# Patient Record
Sex: FEMALE
[De-identification: names, ages, dates, MRNs, and addresses within clinical notes are randomized per-mention and may not be internally consistent; named-entity substitution may affect disease eponyms.]

---

## 2017-01-01 PROBLEM — Z86.19 HX OF HEPATITIS C: Status: ACTIVE | Noted: 2017-01-01

## 2017-01-01 PROBLEM — Z91.89 AT RISK FOR THROMBOSIS: Status: ACTIVE | Noted: 2017-01-01

## 2017-01-01 PROBLEM — E83.111 IRON OVERLOAD DUE TO REPEATED RED BLOOD CELL TRANSFUSIONS: Status: ACTIVE | Noted: 2017-01-01

## 2017-01-01 PROBLEM — Z86.711 HX PULMONARY EMBOLISM: Status: ACTIVE | Noted: 2017-01-01

## 2017-02-05 ENCOUNTER — HOSPITAL (OUTPATIENT)
Dept: OTHER | Age: 27
End: 2017-02-05
Attending: EMERGENCY MEDICINE

## 2017-02-05 LAB
ALBUMIN SERPL-MCNC: 3.5 GM/DL (ref 3.6–5.1)
ALBUMIN/GLOB SERPL: 0.8 {RATIO} (ref 1–2.4)
ALP SERPL-CCNC: 211 UNIT/L (ref 45–117)
ALT SERPL-CCNC: 120 UNIT/L
AMORPH SED URNS QL MICRO: ABNORMAL
ANALYZER ANC (IANC): ABNORMAL
ANION GAP SERPL CALC-SCNC: 14 MMOL/L (ref 10–20)
APPEARANCE UR: CLEAR
AST SERPL-CCNC: 143 UNIT/L
BACTERIA #/AREA URNS HPF: ABNORMAL /HPF
BASOPHILS # BLD: 0 THOUSAND/MCL (ref 0–0.3)
BASOPHILS NFR BLD: 0 %
BILIRUB SERPL-MCNC: 4.4 MG/DL (ref 0.2–1)
BILIRUB UR QL: NEGATIVE
BUN SERPL-MCNC: 5 MG/DL (ref 10–20)
BUN/CREAT SERPL: 7 (ref 7–25)
CALCIUM SERPL-MCNC: 8.2 MG/DL (ref 8.4–10.2)
CAOX CRY URNS QL MICRO: ABNORMAL
CHLORIDE: 106 MMOL/L (ref 98–107)
CO2 SERPL-SCNC: 25 MMOL/L (ref 21–32)
COLOR UR: ABNORMAL
CREAT SERPL-MCNC: 0.69 MG/DL (ref 0.51–0.95)
DIFFERENTIAL METHOD BLD: ABNORMAL
EOSINOPHIL # BLD: 0 THOUSAND/MCL (ref 0.1–0.5)
EOSINOPHIL NFR BLD: 0 %
EPITH CASTS #/AREA URNS LPF: ABNORMAL /[LPF]
ERYTHROCYTE [DISTWIDTH] IN BLOOD: 23.4 % (ref 11–15)
FATTY CASTS #/AREA URNS LPF: ABNORMAL /[LPF]
GLOBULIN SER-MCNC: 4.2 GM/DL (ref 2–4)
GLUCOSE SERPL-MCNC: 89 MG/DL (ref 65–99)
GLUCOSE UR-MCNC: NEGATIVE MG/DL
GRAN CASTS #/AREA URNS LPF: ABNORMAL /[LPF]
HCG POINT OF CARE (5HGRST): NEGATIVE
HEMATOCRIT: 22.5 % (ref 36–46.5)
HG POINT OF CARE QC: NORMAL
HGB BLD-MCNC: 7.8 GM/DL (ref 12–15.5)
HGB UR QL: ABNORMAL
HYALINE CASTS #/AREA URNS LPF: ABNORMAL /LPF (ref 0–5)
HYPOCHROMIA (HYPOC): ABNORMAL
KETONES UR-MCNC: NEGATIVE MG/DL
LACTATE BLDV-SCNC: 1.3 MMOL/L (ref 0–2)
LARGE PLATELETS (PLTL): PRESENT
LEUKOCYTE ESTERASE UR QL STRIP: NEGATIVE
LYMPHOCYTES # BLD: 2.8 THOUSAND/MCL (ref 1–4.8)
LYMPHOCYTES NFR BLD: 19 %
MACROCYTOSIS (MACRO): ABNORMAL
MCH RBC QN AUTO: 33.2 PG (ref 26–34)
MCHC RBC AUTO-ENTMCNC: 34.7 GM/DL (ref 32–36.5)
MCV RBC AUTO: 95.7 FL (ref 78–100)
MICROCYTOSIS (MICY): ABNORMAL
MIXED CELL CASTS #/AREA URNS LPF: ABNORMAL /[LPF]
MONOCYTES # BLD: 3.3 THOUSAND/MCL (ref 0.3–0.9)
MONOCYTES NFR BLD: 23 %
MUCOUS THREADS URNS QL MICRO: PRESENT
NEUTROPHILS # BLD: 8.4 THOUSAND/MCL (ref 1.8–7.7)
NEUTROPHILS NFR BLD: 58 %
NEUTS SEG NFR BLD: ABNORMAL %
NITRITE UR QL: NEGATIVE
PERCENT NRBC: 5
PH UR: 7 UNIT (ref 5–7)
PLATELET # BLD: 273 THOUSAND/MCL (ref 140–450)
POLYCHROMASIA (POLY): ABNORMAL
POTASSIUM SERPL-SCNC: 4.1 MMOL/L (ref 3.4–5.1)
PROT SERPL-MCNC: 7.7 GM/DL (ref 6.4–8.2)
PROT UR QL: NEGATIVE MG/DL
RBC # BLD: 2.35 MILLION/MCL (ref 4–5.2)
RBC #/AREA URNS HPF: ABNORMAL /HPF (ref 0–3)
RBC CASTS #/AREA URNS LPF: ABNORMAL /[LPF]
RENAL EPI CELLS #/AREA URNS HPF: ABNORMAL /[HPF]
SICKLE CELLS (SICL): ABNORMAL
SODIUM SERPL-SCNC: 141 MMOL/L (ref 135–145)
SP GR UR: 1.01 (ref 1–1.03)
SPECIMEN SOURCE: ABNORMAL
SPERM URNS QL MICRO: ABNORMAL
SQUAMOUS #/AREA URNS HPF: ABNORMAL /HPF (ref 0–5)
T VAGINALIS URNS QL MICRO: ABNORMAL
TARGET CELLS (TARGET): ABNORMAL
TOXIC GRANULATION (TOXIC): PRESENT
TRI-PHOS CRY URNS QL MICRO: ABNORMAL
URATE CRY URNS QL MICRO: ABNORMAL
URINE REFLEX: ABNORMAL
URNS CMNT MICRO: ABNORMAL
UROBILINOGEN UR QL: 4 MG/DL (ref 0–1)
WAXY CASTS #/AREA URNS LPF: ABNORMAL /[LPF]
WBC # BLD: 14.5 THOUSAND/MCL (ref 4.2–11)
WBC #/AREA URNS HPF: ABNORMAL /HPF (ref 0–5)
WBC CASTS #/AREA URNS LPF: ABNORMAL /[LPF]
YEAST HYPHAE URNS QL MICRO: ABNORMAL
YEAST URNS QL MICRO: ABNORMAL

## 2017-02-06 ENCOUNTER — HOSPITAL (OUTPATIENT)
Dept: OTHER | Age: 27
End: 2017-02-06
Attending: INTERNAL MEDICINE

## 2017-02-06 LAB
ALBUMIN SERPL-MCNC: 3.4 GM/DL (ref 3.6–5.1)
ALBUMIN/GLOB SERPL: 0.8 {RATIO} (ref 1–2.4)
ALP SERPL-CCNC: 200 UNIT/L (ref 45–117)
ALT SERPL-CCNC: 123 UNIT/L
ANALYZER ANC (IANC): ABNORMAL
ANION GAP SERPL CALC-SCNC: 16 MMOL/L (ref 10–20)
AST SERPL-CCNC: 184 UNIT/L
BASOPHILS # BLD: 0.1 THOUSAND/MCL (ref 0–0.3)
BASOPHILS NFR BLD: 0 %
BILIRUB SERPL-MCNC: 5 MG/DL (ref 0.2–1)
BUN SERPL-MCNC: 8 MG/DL (ref 10–20)
BUN/CREAT SERPL: 12 (ref 7–25)
CALCIUM SERPL-MCNC: 8.2 MG/DL (ref 8.4–10.2)
CHLORIDE: 108 MMOL/L (ref 98–107)
CO2 SERPL-SCNC: 22 MMOL/L (ref 21–32)
CREAT SERPL-MCNC: 0.67 MG/DL (ref 0.51–0.95)
DIFFERENTIAL METHOD BLD: ABNORMAL
EOSINOPHIL # BLD: 0 THOUSAND/MCL (ref 0.1–0.5)
EOSINOPHIL NFR BLD: 0 %
ERYTHROCYTE [DISTWIDTH] IN BLOOD: 23.1 % (ref 11–15)
GLOBULIN SER-MCNC: 4.2 GM/DL (ref 2–4)
GLUCOSE SERPL-MCNC: 90 MG/DL (ref 65–99)
HEMATOCRIT: 20.4 % (ref 36–46.5)
HGB BLD-MCNC: 7.4 GM/DL (ref 12–15.5)
HYPOCHROMIA (HYPOC): ABNORMAL
LARGE PLATELETS (PLTL): PRESENT
LYMPHOCYTES # BLD: 6.8 THOUSAND/MCL (ref 1–4.8)
LYMPHOCYTES NFR BLD: 42 %
MACROCYTOSIS (MACRO): ABNORMAL
MCH RBC QN AUTO: 32.9 PG (ref 26–34)
MCHC RBC AUTO-ENTMCNC: 36.3 GM/DL (ref 32–36.5)
MCV RBC AUTO: 90.7 FL (ref 78–100)
MICROCYTOSIS (MICY): ABNORMAL
MONOCYTES # BLD: 2.4 THOUSAND/MCL (ref 0.3–0.9)
MONOCYTES NFR BLD: 15 %
NEUTROPHILS # BLD: 6.9 THOUSAND/MCL (ref 1.8–7.7)
NEUTROPHILS NFR BLD: 43 %
NEUTS SEG NFR BLD: ABNORMAL %
OVALOCYTES (OVALO): ABNORMAL
PERCENT NRBC: 9
PLATELET # BLD: 233 THOUSAND/MCL (ref 140–450)
POLYCHROMASIA (POLY): ABNORMAL
POTASSIUM SERPL-SCNC: 4 MMOL/L (ref 3.4–5.1)
PROT SERPL-MCNC: 7.6 GM/DL (ref 6.4–8.2)
RBC # BLD: 2.25 MILLION/MCL (ref 4–5.2)
SICKLE CELLS (SICL): ABNORMAL
SODIUM SERPL-SCNC: 142 MMOL/L (ref 135–145)
TARGET CELLS (TARGET): ABNORMAL
TOXIC GRANULATION (TOXIC): PRESENT
TOXIC VACUOLATION (TOXV): PRESENT
WBC # BLD: 16.2 THOUSAND/MCL (ref 4.2–11)

## 2017-02-07 LAB
ALBUMIN SERPL-MCNC: 2.8 GM/DL (ref 3.6–5.1)
ALBUMIN/GLOB SERPL: 0.8 {RATIO} (ref 1–2.4)
ALP SERPL-CCNC: 161 UNIT/L (ref 45–117)
ALT SERPL-CCNC: 99 UNIT/L
ANALYZER ANC (IANC): ABNORMAL
ANION GAP SERPL CALC-SCNC: 11 MMOL/L (ref 10–20)
AST SERPL-CCNC: 141 UNIT/L
BASOPHILS # BLD: 0 THOUSAND/MCL (ref 0–0.3)
BASOPHILS NFR BLD: 0 %
BILIRUB SERPL-MCNC: 3.4 MG/DL (ref 0.2–1)
BUN SERPL-MCNC: 10 MG/DL (ref 10–20)
BUN/CREAT SERPL: 18 (ref 7–25)
CALCIUM SERPL-MCNC: 7.6 MG/DL (ref 8.4–10.2)
CHLORIDE: 112 MMOL/L (ref 98–107)
CO2 SERPL-SCNC: 24 MMOL/L (ref 21–32)
CREAT SERPL-MCNC: 0.56 MG/DL (ref 0.51–0.95)
DIFFERENTIAL METHOD BLD: ABNORMAL
EOSINOPHIL # BLD: 0.1 THOUSAND/MCL (ref 0.1–0.5)
EOSINOPHIL NFR BLD: 1 %
ERYTHROCYTE [DISTWIDTH] IN BLOOD: 21 % (ref 11–15)
GLOBULIN SER-MCNC: 3.7 GM/DL (ref 2–4)
GLUCOSE SERPL-MCNC: 89 MG/DL (ref 65–99)
HEMATOCRIT: 21.8 % (ref 36–46.5)
HGB BLD-MCNC: 7.8 GM/DL (ref 12–15.5)
HYPOCHROMIA (HYPOC): ABNORMAL
LARGE PLATELETS (PLTL): PRESENT
LYMPHOCYTES # BLD: 8.6 THOUSAND/MCL (ref 1–4.8)
LYMPHOCYTES NFR BLD: 61 %
MACROCYTOSIS (MACRO): ABNORMAL
MCH RBC QN AUTO: 32 PG (ref 26–34)
MCHC RBC AUTO-ENTMCNC: 35.8 GM/DL (ref 32–36.5)
MCV RBC AUTO: 89.3 FL (ref 78–100)
MICROCYTOSIS (MICY): ABNORMAL
MONOCYTES # BLD: 0.8 THOUSAND/MCL (ref 0.3–0.9)
MONOCYTES NFR BLD: 6 %
NEUTROPHILS # BLD: 4.5 THOUSAND/MCL (ref 1.8–7.7)
NEUTS SEG NFR BLD: 32 %
NRBC BLD MANUAL-RTO: 20 /100 WBC
PATH REV BLD -IMP: ABNORMAL
PLATELET # BLD: 199 THOUSAND/MCL (ref 140–450)
POLYCHROMASIA (POLY): ABNORMAL
POTASSIUM SERPL-SCNC: 4.1 MMOL/L (ref 3.4–5.1)
PROT SERPL-MCNC: 6.5 GM/DL (ref 6.4–8.2)
RBC # BLD: 2.44 MILLION/MCL (ref 4–5.2)
SICKLE CELLS (SICL): ABNORMAL
SODIUM SERPL-SCNC: 143 MMOL/L (ref 135–145)
TARGET CELLS (TARGET): ABNORMAL
TOXIC GRANULATION (TOXIC): PRESENT
WBC # BLD: 14.1 THOUSAND/MCL (ref 4.2–11)

## 2017-02-08 LAB
ALBUMIN SERPL-MCNC: 3 GM/DL (ref 3.6–5.1)
ALBUMIN/GLOB SERPL: 0.8 {RATIO} (ref 1–2.4)
ALP SERPL-CCNC: 168 UNIT/L (ref 45–117)
ALT SERPL-CCNC: 108 UNIT/L
ANALYZER ANC (IANC): ABNORMAL
ANION GAP SERPL CALC-SCNC: 11 MMOL/L (ref 10–20)
AST SERPL-CCNC: 149 UNIT/L
BASOPHILS # BLD: 0 THOUSAND/MCL (ref 0–0.3)
BASOPHILS NFR BLD: 0 %
BILIRUB SERPL-MCNC: 3.4 MG/DL (ref 0.2–1)
BUN SERPL-MCNC: 7 MG/DL (ref 10–20)
BUN/CREAT SERPL: 11 (ref 7–25)
CALCIUM SERPL-MCNC: 7.7 MG/DL (ref 8.4–10.2)
CHLORIDE: 109 MMOL/L (ref 98–107)
CO2 SERPL-SCNC: 25 MMOL/L (ref 21–32)
CREAT SERPL-MCNC: 0.65 MG/DL (ref 0.51–0.95)
DIFFERENTIAL METHOD BLD: ABNORMAL
EOSINOPHIL # BLD: 0 THOUSAND/MCL (ref 0.1–0.5)
EOSINOPHIL NFR BLD: 0 %
ERYTHROCYTE [DISTWIDTH] IN BLOOD: 22.3 % (ref 11–15)
GLOBULIN SER-MCNC: 3.9 GM/DL (ref 2–4)
GLUCOSE SERPL-MCNC: 89 MG/DL (ref 65–99)
HEMATOCRIT: 21.5 % (ref 36–46.5)
HGB BLD-MCNC: 7.4 GM/DL (ref 12–15.5)
HYPOCHROMIA (HYPOC): ABNORMAL
LARGE PLATELETS (PLTL): PRESENT
LYMPHOCYTES # BLD: 7.7 THOUSAND/MCL (ref 1–4.8)
LYMPHOCYTES NFR BLD: 48 %
MACROCYTOSIS (MACRO): ABNORMAL
MCH RBC QN AUTO: 32 PG (ref 26–34)
MCHC RBC AUTO-ENTMCNC: 34.4 GM/DL (ref 32–36.5)
MCV RBC AUTO: 93.1 FL (ref 78–100)
MICROCYTOSIS (MICY): ABNORMAL
MONOCYTES # BLD: 0.6 THOUSAND/MCL (ref 0.3–0.9)
MONOCYTES NFR BLD: 4 %
NEUTROPHILS # BLD: 7.7 THOUSAND/MCL (ref 1.8–7.7)
NEUTS SEG NFR BLD: 48 %
NRBC BLD MANUAL-RTO: 10 /100 WBC
PATH REV BLD -IMP: ABNORMAL
PLATELET # BLD: 214 THOUSAND/MCL (ref 140–450)
POLYCHROMASIA (POLY): ABNORMAL
POTASSIUM SERPL-SCNC: 4.2 MMOL/L (ref 3.4–5.1)
PROT SERPL-MCNC: 6.9 GM/DL (ref 6.4–8.2)
RBC # BLD: 2.31 MILLION/MCL (ref 4–5.2)
SICKLE CELLS (SICL): ABNORMAL
SODIUM SERPL-SCNC: 141 MMOL/L (ref 135–145)
TARGET CELLS (TARGET): ABNORMAL
WBC # BLD: 16.1 THOUSAND/MCL (ref 4.2–11)

## 2017-02-09 LAB
ALBUMIN SERPL-MCNC: 3.1 GM/DL (ref 3.6–5.1)
ALBUMIN/GLOB SERPL: 0.8 {RATIO} (ref 1–2.4)
ALP SERPL-CCNC: 177 UNIT/L (ref 45–117)
ALT SERPL-CCNC: 95 UNIT/L
ANALYZER ANC (IANC): ABNORMAL
ANION GAP SERPL CALC-SCNC: 12 MMOL/L (ref 10–20)
AST SERPL-CCNC: 120 UNIT/L
BASOPHILS # BLD: 0 THOUSAND/MCL (ref 0–0.3)
BASOPHILS NFR BLD: 0 %
BILIRUB SERPL-MCNC: 3.8 MG/DL (ref 0.2–1)
BUN SERPL-MCNC: 9 MG/DL (ref 10–20)
BUN/CREAT SERPL: 13 (ref 7–25)
CALCIUM SERPL-MCNC: 7.9 MG/DL (ref 8.4–10.2)
CHLORIDE: 109 MMOL/L (ref 98–107)
CO2 SERPL-SCNC: 25 MMOL/L (ref 21–32)
CREAT SERPL-MCNC: 0.68 MG/DL (ref 0.51–0.95)
DIFFERENTIAL METHOD BLD: ABNORMAL
EOSINOPHIL # BLD: 0 THOUSAND/MCL (ref 0.1–0.5)
EOSINOPHIL NFR BLD: 0 %
ERYTHROCYTE [DISTWIDTH] IN BLOOD: 22.8 % (ref 11–15)
GLOBULIN SER-MCNC: 4.1 GM/DL (ref 2–4)
GLUCOSE SERPL-MCNC: 92 MG/DL (ref 65–99)
HEMATOCRIT: 21.2 % (ref 36–46.5)
HGB BLD-MCNC: 7.5 GM/DL (ref 12–15.5)
HYPOCHROMIA (HYPOC): ABNORMAL
LYMPHOCYTES # BLD: 8.4 THOUSAND/MCL (ref 1–4.8)
LYMPHOCYTES NFR BLD: 58 %
MACROCYTOSIS (MACRO): ABNORMAL
MCH RBC QN AUTO: 32.6 PG (ref 26–34)
MCHC RBC AUTO-ENTMCNC: 35.4 GM/DL (ref 32–36.5)
MCV RBC AUTO: 92.2 FL (ref 78–100)
MICROCYTOSIS (MICY): ABNORMAL
MONOCYTES # BLD: 1.2 THOUSAND/MCL (ref 0.3–0.9)
MONOCYTES NFR BLD: 8 %
NEUTROPHILS # BLD: 4.9 THOUSAND/MCL (ref 1.8–7.7)
NEUTS SEG NFR BLD: 34 %
NRBC BLD MANUAL-RTO: 12 /100 WBC
PATH REV BLD -IMP: ABNORMAL
PLAT MORPH BLD: NORMAL
PLATELET # BLD: 208 THOUSAND/MCL (ref 140–450)
POLYCHROMASIA (POLY): ABNORMAL
POTASSIUM SERPL-SCNC: 3.9 MMOL/L (ref 3.4–5.1)
PROT SERPL-MCNC: 7.2 GM/DL (ref 6.4–8.2)
RBC # BLD: 2.3 MILLION/MCL (ref 4–5.2)
SICKLE CELLS (SICL): ABNORMAL
SODIUM SERPL-SCNC: 142 MMOL/L (ref 135–145)
TARGET CELLS (TARGET): ABNORMAL
WBC # BLD: 14.5 THOUSAND/MCL (ref 4.2–11)

## 2017-02-10 LAB
ALBUMIN SERPL-MCNC: 3.3 GM/DL (ref 3.6–5.1)
ALBUMIN/GLOB SERPL: 0.8 {RATIO} (ref 1–2.4)
ALP SERPL-CCNC: 188 UNIT/L (ref 45–117)
ALT SERPL-CCNC: 91 UNIT/L
ANALYZER ANC (IANC): ABNORMAL
ANION GAP SERPL CALC-SCNC: 12 MMOL/L (ref 10–20)
AST SERPL-CCNC: 110 UNIT/L
BASOPHILIC STIPLING (BAST): PRESENT
BASOPHILS # BLD: 0 THOUSAND/MCL (ref 0–0.3)
BASOPHILS NFR BLD: 0 %
BILIRUB SERPL-MCNC: 4.1 MG/DL (ref 0.2–1)
BUN SERPL-MCNC: 8 MG/DL (ref 10–20)
BUN/CREAT SERPL: 12 (ref 7–25)
CALCIUM SERPL-MCNC: 8 MG/DL (ref 8.4–10.2)
CHLORIDE: 108 MMOL/L (ref 98–107)
CO2 SERPL-SCNC: 26 MMOL/L (ref 21–32)
CREAT SERPL-MCNC: 0.65 MG/DL (ref 0.51–0.95)
DIFFERENTIAL METHOD BLD: ABNORMAL
EOSINOPHIL # BLD: 0.1 THOUSAND/MCL (ref 0.1–0.5)
EOSINOPHIL NFR BLD: 1 %
ERYTHROCYTE [DISTWIDTH] IN BLOOD: 22.8 % (ref 11–15)
GLOBULIN SER-MCNC: 4 GM/DL (ref 2–4)
GLUCOSE SERPL-MCNC: 104 MG/DL (ref 65–99)
HEMATOCRIT: 19.9 % (ref 36–46.5)
HGB BLD-MCNC: 7 GM/DL (ref 12–15.5)
HOWELL-JOLLY BODIES (HJBO): PRESENT
HYPOCHROMIA (HYPOC): ABNORMAL
LYMPHOCYTES # BLD: 6.1 THOUSAND/MCL (ref 1–4.8)
LYMPHOCYTES NFR BLD: 42 %
MACROCYTOSIS (MACRO): ABNORMAL
MCH RBC QN AUTO: 32.9 PG (ref 26–34)
MCHC RBC AUTO-ENTMCNC: 35.2 GM/DL (ref 32–36.5)
MCV RBC AUTO: 93.4 FL (ref 78–100)
METAMYELOCYTES NFR BLD: 1 % (ref 0–2)
MICROCYTOSIS (MICY): ABNORMAL
MONOCYTES # BLD: 0.8 THOUSAND/MCL (ref 0.3–0.9)
MONOCYTES NFR BLD: 6 %
NEUTROPHILS # BLD: 7.3 THOUSAND/MCL (ref 1.8–7.7)
NEUTS SEG NFR BLD: 50 %
NRBC BLD MANUAL-RTO: 15 /100 WBC
PATH REV BLD -IMP: ABNORMAL
PLATELET # BLD: 212 THOUSAND/MCL (ref 140–450)
POLYCHROMASIA (POLY): ABNORMAL
POTASSIUM SERPL-SCNC: 4.1 MMOL/L (ref 3.4–5.1)
PROT SERPL-MCNC: 7.3 GM/DL (ref 6.4–8.2)
RBC # BLD: 2.13 MILLION/MCL (ref 4–5.2)
SICKLE CELLS (SICL): ABNORMAL
SODIUM SERPL-SCNC: 142 MMOL/L (ref 135–145)
TARGET CELLS (TARGET): ABNORMAL
WBC # BLD: 13.6 THOUSAND/MCL (ref 4.2–11)

## 2017-02-11 LAB
ANALYZER ANC (IANC): ABNORMAL
BASOPHILS # BLD: 0.2 THOUSAND/MCL (ref 0–0.3)
BASOPHILS NFR BLD: 1 %
DIFFERENTIAL METHOD BLD: ABNORMAL
EOSINOPHIL # BLD: 0 THOUSAND/MCL (ref 0.1–0.5)
EOSINOPHIL NFR BLD: 0 %
ERYTHROCYTE [DISTWIDTH] IN BLOOD: 22.9 % (ref 11–15)
HEMATOCRIT: 20.7 % (ref 36–46.5)
HGB BLD-MCNC: 7 GM/DL (ref 12–15.5)
HOWELL-JOLLY BODIES (HJBO): PRESENT
HYPOCHROMIA (HYPOC): ABNORMAL
LYMPHOCYTES # BLD: 7.3 THOUSAND/MCL (ref 1–4.8)
LYMPHOCYTES NFR BLD: 48 %
MACROCYTOSIS (MACRO): ABNORMAL
MCH RBC QN AUTO: 32.4 PG (ref 26–34)
MCHC RBC AUTO-ENTMCNC: 33.8 GM/DL (ref 32–36.5)
MCV RBC AUTO: 95.8 FL (ref 78–100)
MICROCYTOSIS (MICY): ABNORMAL
MONOCYTES # BLD: 0.8 THOUSAND/MCL (ref 0.3–0.9)
MONOCYTES NFR BLD: 5 %
NEUTROPHILS # BLD: 7 THOUSAND/MCL (ref 1.8–7.7)
NEUTS SEG NFR BLD: 46 %
NRBC BLD MANUAL-RTO: 7 /100 WBC
PATH REV BLD -IMP: ABNORMAL
PLATELET # BLD: 263 THOUSAND/MCL (ref 140–450)
POLYCHROMASIA (POLY): ABNORMAL
RBC # BLD: 2.16 MILLION/MCL (ref 4–5.2)
SICKLE CELLS (SICL): ABNORMAL
TARGET CELLS (TARGET): ABNORMAL
WBC # BLD: 15.3 THOUSAND/MCL (ref 4.2–11)

## 2017-02-12 LAB
ALBUMIN SERPL-MCNC: 2.8 GM/DL (ref 3.6–5.1)
ALBUMIN/GLOB SERPL: 0.7 {RATIO} (ref 1–2.4)
ALP SERPL-CCNC: 188 UNIT/L (ref 45–117)
ALT SERPL-CCNC: 80 UNIT/L
ANALYZER ANC (IANC): ABNORMAL
ANION GAP SERPL CALC-SCNC: 10 MMOL/L (ref 10–20)
AST SERPL-CCNC: 97 UNIT/L
BASOPHILS # BLD: 0 THOUSAND/MCL (ref 0–0.3)
BASOPHILS NFR BLD: 0 %
BILIRUB SERPL-MCNC: 2.8 MG/DL (ref 0.2–1)
BUN SERPL-MCNC: 4 MG/DL (ref 10–20)
BUN/CREAT SERPL: 7 (ref 7–25)
CALCIUM SERPL-MCNC: 7.8 MG/DL (ref 8.4–10.2)
CHLORIDE: 109 MMOL/L (ref 98–107)
CO2 SERPL-SCNC: 28 MMOL/L (ref 21–32)
CREAT SERPL-MCNC: 0.58 MG/DL (ref 0.51–0.95)
DIFFERENTIAL METHOD BLD: ABNORMAL
EOSINOPHIL # BLD: 0.1 THOUSAND/MCL (ref 0.1–0.5)
EOSINOPHIL NFR BLD: 1 %
ERYTHROCYTE [DISTWIDTH] IN BLOOD: 22.7 % (ref 11–15)
GLOBULIN SER-MCNC: 3.9 GM/DL (ref 2–4)
GLUCOSE SERPL-MCNC: 90 MG/DL (ref 65–99)
HEMATOCRIT: 20.1 % (ref 36–46.5)
HEMATOCRIT: 24 % (ref 36–46.5)
HGB BLD-MCNC: 6.7 GM/DL (ref 12–15.5)
HGB BLD-MCNC: 8.2 GM/DL (ref 12–15.5)
HOWELL-JOLLY BODIES (HJBO): PRESENT
HYPOCHROMIA (HYPOC): ABNORMAL
LARGE PLATELETS (PLTL): PRESENT
LYMPHOCYTES # BLD: 5.9 THOUSAND/MCL (ref 1–4.8)
LYMPHOCYTES NFR BLD: 44 %
MACROCYTOSIS (MACRO): ABNORMAL
MCH RBC QN AUTO: 32.4 PG (ref 26–34)
MCHC RBC AUTO-ENTMCNC: 33.3 GM/DL (ref 32–36.5)
MCV RBC AUTO: 97.1 FL (ref 78–100)
MICROCYTOSIS (MICY): ABNORMAL
MONOCYTES # BLD: 1.2 THOUSAND/MCL (ref 0.3–0.9)
MONOCYTES NFR BLD: 9 %
NEUTROPHILS # BLD: 6.2 THOUSAND/MCL (ref 1.8–7.7)
NEUTS SEG NFR BLD: 46 %
NRBC BLD MANUAL-RTO: 11 /100 WBC
PATH REV BLD -IMP: ABNORMAL
PLATELET # BLD: 278 THOUSAND/MCL (ref 140–450)
POLYCHROMASIA (POLY): ABNORMAL
POTASSIUM SERPL-SCNC: 3.6 MMOL/L (ref 3.4–5.1)
PROT SERPL-MCNC: 6.7 GM/DL (ref 6.4–8.2)
RBC # BLD: 2.07 MILLION/MCL (ref 4–5.2)
SICKLE CELLS (SICL): ABNORMAL
SODIUM SERPL-SCNC: 143 MMOL/L (ref 135–145)
TARGET CELLS (TARGET): ABNORMAL
WBC # BLD: 13.4 THOUSAND/MCL (ref 4.2–11)

## 2017-02-13 ENCOUNTER — CHARTING TRANS (OUTPATIENT)
Dept: OTHER | Age: 27
End: 2017-02-13

## 2017-02-13 LAB
ANALYZER ANC (IANC): ABNORMAL
BASOPHILS # BLD: 0 THOUSAND/MCL (ref 0–0.3)
BASOPHILS NFR BLD: 0 %
DIFFERENTIAL METHOD BLD: ABNORMAL
EOSINOPHIL # BLD: 0 THOUSAND/MCL (ref 0.1–0.5)
EOSINOPHIL NFR BLD: 0 %
ERYTHROCYTE [DISTWIDTH] IN BLOOD: 24.1 % (ref 11–15)
HEMATOCRIT: 23.8 % (ref 36–46.5)
HGB BLD-MCNC: 8.2 GM/DL (ref 12–15.5)
HYPOCHROMIA (HYPOC): ABNORMAL
LARGE PLATELETS (PLTL): PRESENT
LYMPHOCYTES # BLD: 5 THOUSAND/MCL (ref 1–4.8)
LYMPHOCYTES NFR BLD: 40 %
MACROCYTOSIS (MACRO): ABNORMAL
MCH RBC QN AUTO: 32.3 PG (ref 26–34)
MCHC RBC AUTO-ENTMCNC: 34.5 GM/DL (ref 32–36.5)
MCV RBC AUTO: 93.7 FL (ref 78–100)
MICROCYTOSIS (MICY): ABNORMAL
MONOCYTES # BLD: 1.1 THOUSAND/MCL (ref 0.3–0.9)
MONOCYTES NFR BLD: 9 %
NEUTROPHILS # BLD: 6.4 THOUSAND/MCL (ref 1.8–7.7)
NEUTS SEG NFR BLD: 51 %
NRBC BLD MANUAL-RTO: 10 /100 WBC
OVALOCYTES (OVALO): ABNORMAL
PATH REV BLD -IMP: ABNORMAL
PLATELET # BLD: 311 THOUSAND/MCL (ref 140–450)
POLYCHROMASIA (POLY): ABNORMAL
RBC # BLD: 2.54 MILLION/MCL (ref 4–5.2)
SICKLE CELLS (SICL): ABNORMAL
SMUDGE CELLS (SMUD): PRESENT
STOMATOCYTES (STOM): ABNORMAL
TARGET CELLS (TARGET): ABNORMAL
TEAR DROP CELLS (TEARD): ABNORMAL
TOXIC GRANULATION (TOXIC): PRESENT
WBC # BLD: 12.5 THOUSAND/MCL (ref 4.2–11)

## 2017-02-16 ENCOUNTER — HOSPITAL (OUTPATIENT)
Dept: OTHER | Age: 27
End: 2017-02-16
Attending: INTERNAL MEDICINE

## 2017-02-16 LAB
ANALYZER ANC (IANC): ABNORMAL
ANION GAP SERPL CALC-SCNC: 11 MMOL/L (ref 10–20)
BASOPHILIC STIPLING (BAST): PRESENT
BASOPHILS # BLD: 0 THOUSAND/MCL (ref 0–0.3)
BASOPHILS NFR BLD: 0 %
BUN SERPL-MCNC: 11 MG/DL (ref 10–20)
BUN/CREAT SERPL: 15 (ref 7–25)
CALCIUM SERPL-MCNC: 8.2 MG/DL (ref 8.4–10.2)
CHLORIDE: 107 MMOL/L (ref 98–107)
CO2 SERPL-SCNC: 26 MMOL/L (ref 21–32)
CREAT SERPL-MCNC: 0.72 MG/DL (ref 0.51–0.95)
DIFFERENTIAL METHOD BLD: ABNORMAL
EOSINOPHIL # BLD: 0 THOUSAND/MCL (ref 0.1–0.5)
EOSINOPHIL NFR BLD: 0 %
ERYTHROCYTE [DISTWIDTH] IN BLOOD: 22.6 % (ref 11–15)
GLUCOSE SERPL-MCNC: 100 MG/DL (ref 65–99)
HEMATOCRIT: 24 % (ref 36–46.5)
HGB BLD-MCNC: 8.3 GM/DL (ref 12–15.5)
HOWELL-JOLLY BODIES (HJBO): PRESENT
HYPOCHROMIA (HYPOC): ABNORMAL
LARGE PLATELETS (PLTL): PRESENT
LYMPHOCYTES # BLD: 6 THOUSAND/MCL (ref 1–4.8)
LYMPHOCYTES NFR BLD: 48 %
MACROCYTOSIS (MACRO): ABNORMAL
MCH RBC QN AUTO: 33.2 PG (ref 26–34)
MCHC RBC AUTO-ENTMCNC: 34.6 GM/DL (ref 32–36.5)
MCV RBC AUTO: 96 FL (ref 78–100)
MICROCYTOSIS (MICY): ABNORMAL
MONOCYTES # BLD: 1.4 THOUSAND/MCL (ref 0.3–0.9)
MONOCYTES NFR BLD: 11 %
NEUTROPHILS # BLD: 5.1 THOUSAND/MCL (ref 1.8–7.7)
NEUTS SEG NFR BLD: 41 %
NRBC BLD MANUAL-RTO: 14 /100 WBC
PATH REV BLD -IMP: ABNORMAL
PLATELET # BLD: 327 THOUSAND/MCL (ref 140–450)
POTASSIUM SERPL-SCNC: 4.1 MMOL/L (ref 3.4–5.1)
RBC # BLD: 2.5 MILLION/MCL (ref 4–5.2)
SICKLE CELLS (SICL): ABNORMAL
SODIUM SERPL-SCNC: 140 MMOL/L (ref 135–145)
TARGET CELLS (TARGET): ABNORMAL
WBC # BLD: 12.4 THOUSAND/MCL (ref 4.2–11)

## 2017-02-17 LAB
ANALYZER ANC (IANC): 7.6 THOUSAND/MCL (ref 1.8–7.7)
ANION GAP SERPL CALC-SCNC: 13 MMOL/L (ref 10–20)
BASOPHILS # BLD: 0 THOUSAND/MCL (ref 0–0.3)
BASOPHILS NFR BLD: 0 %
BUN SERPL-MCNC: 8 MG/DL (ref 10–20)
BUN/CREAT SERPL: 12 (ref 7–25)
CALCIUM SERPL-MCNC: 8.8 MG/DL (ref 8.4–10.2)
CHLORIDE: 104 MMOL/L (ref 98–107)
CK SERPL-CCNC: 54 UNIT/L (ref 26–192)
CO2 SERPL-SCNC: 27 MMOL/L (ref 21–32)
CREAT SERPL-MCNC: 0.66 MG/DL (ref 0.51–0.95)
DIFFERENTIAL METHOD BLD: ABNORMAL
EOSINOPHIL # BLD: 0.1 THOUSAND/MCL (ref 0.1–0.5)
EOSINOPHIL NFR BLD: 1 %
ERYTHROCYTE [DISTWIDTH] IN BLOOD: 22.2 % (ref 11–15)
GLUCOSE SERPL-MCNC: 82 MG/DL (ref 65–99)
HEMATOCRIT: 28.8 % (ref 36–46.5)
HGB BLD-MCNC: 9.7 GM/DL (ref 12–15.5)
LYMPHOCYTES # BLD: 3 THOUSAND/MCL (ref 1–4.8)
LYMPHOCYTES NFR BLD: 24 %
MACROCYTOSIS (MACRO): ABNORMAL
MCH RBC QN AUTO: 32.7 PG (ref 26–34)
MCHC RBC AUTO-ENTMCNC: 33.7 GM/DL (ref 32–36.5)
MCV RBC AUTO: 97 FL (ref 78–100)
MONOCYTES # BLD: 0.8 THOUSAND/MCL (ref 0.3–0.9)
MONOCYTES NFR BLD: 6 %
NEUTROPHILS # BLD: 8.7 THOUSAND/MCL (ref 1.8–7.7)
NEUTS SEG NFR BLD: 69 %
NRBC BLD MANUAL-RTO: 4 /100 WBC
PATH REV BLD -IMP: ABNORMAL
PLATELET # BLD: 394 THOUSAND/MCL (ref 140–450)
POTASSIUM SERPL-SCNC: 4.2 MMOL/L (ref 3.4–5.1)
RBC # BLD: 2.97 MILLION/MCL (ref 4–5.2)
SICKLE CELLS (SICL): ABNORMAL
SODIUM SERPL-SCNC: 140 MMOL/L (ref 135–145)
TARGET CELLS (TARGET): ABNORMAL
WBC # BLD: 12.6 THOUSAND/MCL (ref 4.2–11)

## 2017-02-23 LAB
ANALYZER ANC (IANC): 8.5 THOUSAND/MCL (ref 1.8–7.7)
ANION GAP SERPL CALC-SCNC: 13 MMOL/L (ref 10–20)
BASOPHILS # BLD: 0 THOUSAND/MCL (ref 0–0.3)
BASOPHILS NFR BLD: 0 %
BUN SERPL-MCNC: 9 MG/DL (ref 6–20)
BUN/CREAT SERPL: 13 (ref 7–25)
CALCIUM SERPL-MCNC: 8.5 MG/DL (ref 8.4–10.2)
CHLORIDE: 105 MMOL/L (ref 98–107)
CK SERPL-CCNC: 31 UNIT/L (ref 26–192)
CO2 SERPL-SCNC: 26 MMOL/L (ref 21–32)
CREAT SERPL-MCNC: 0.69 MG/DL (ref 0.51–0.95)
DIFFERENTIAL METHOD BLD: ABNORMAL
EOSINOPHIL # BLD: 0.1 THOUSAND/MCL (ref 0.1–0.5)
EOSINOPHIL NFR BLD: 1 %
ERYTHROCYTE [DISTWIDTH] IN BLOOD: 18.8 % (ref 11–15)
GLUCOSE SERPL-MCNC: 95 MG/DL (ref 65–99)
HEMATOCRIT: 27.2 % (ref 36–46.5)
HGB BLD-MCNC: 9.2 GM/DL (ref 12–15.5)
LYMPHOCYTES # BLD: 4.9 THOUSAND/MCL (ref 1–4.8)
LYMPHOCYTES NFR BLD: 31 %
MCH RBC QN AUTO: 32.3 PG (ref 26–34)
MCHC RBC AUTO-ENTMCNC: 33.8 GM/DL (ref 32–36.5)
MCV RBC AUTO: 95.4 FL (ref 78–100)
MONOCYTES # BLD: 2.2 THOUSAND/MCL (ref 0.3–0.9)
MONOCYTES NFR BLD: 14 %
NEUTROPHILS # BLD: 8.5 THOUSAND/MCL (ref 1.8–7.7)
NEUTROPHILS NFR BLD: 54 %
NEUTS SEG NFR BLD: ABNORMAL %
PERCENT NRBC: 1
PLATELET # BLD: 377 THOUSAND/MCL (ref 140–450)
POTASSIUM SERPL-SCNC: 4 MMOL/L (ref 3.4–5.1)
RBC # BLD: 2.85 MILLION/MCL (ref 4–5.2)
SODIUM SERPL-SCNC: 140 MMOL/L (ref 135–145)
WBC # BLD: 15.6 THOUSAND/MCL (ref 4.2–11)

## 2017-04-04 ENCOUNTER — TELEPHONE (OUTPATIENT)
Dept: HEMATOLOGY/ONCOLOGY | Facility: HOSPITAL | Age: 27
End: 2017-04-04

## 2017-04-04 NOTE — TELEPHONE ENCOUNTER
Patient calling today to make post hospital f/u from 12/31/16. She did not have ins at the time of admission but now has Public aide. Please advise.  sree

## 2017-04-07 ENCOUNTER — TELEPHONE (OUTPATIENT)
Dept: HEMATOLOGY/ONCOLOGY | Facility: HOSPITAL | Age: 27
End: 2017-04-07

## 2017-04-07 NOTE — TELEPHONE ENCOUNTER
Latia made aware to get labs at [facility] prior to her appt.

## 2017-04-07 NOTE — TELEPHONE ENCOUNTER
Called pt, Raghavendra Evangelista- Dr. El Aly would like pt to get labs prior to her appt next week.

## 2017-04-10 ENCOUNTER — OFFICE VISIT (OUTPATIENT)
Dept: HEMATOLOGY/ONCOLOGY | Facility: HOSPITAL | Age: 27
End: 2017-04-10
Attending: INTERNAL MEDICINE
Payer: MEDICAID

## 2017-04-10 VITALS
TEMPERATURE: 99 F | WEIGHT: 156 LBS | HEIGHT: 69 IN | BODY MASS INDEX: 23.11 KG/M2 | RESPIRATION RATE: 16 BRPM | SYSTOLIC BLOOD PRESSURE: 127 MMHG | DIASTOLIC BLOOD PRESSURE: 72 MMHG | HEART RATE: 98 BPM

## 2017-04-10 DIAGNOSIS — D57.1 HB-SS DISEASE WITHOUT CRISIS (HCC): Primary | ICD-10-CM

## 2017-04-10 DIAGNOSIS — E83.111 IRON OVERLOAD DUE TO REPEATED RED BLOOD CELL TRANSFUSIONS: ICD-10-CM

## 2017-04-10 DIAGNOSIS — Z86.711 HX PULMONARY EMBOLISM: ICD-10-CM

## 2017-04-10 PROCEDURE — 99213 OFFICE O/P EST LOW 20 MIN: CPT | Performed by: INTERNAL MEDICINE

## 2017-04-10 PROCEDURE — 99212 OFFICE O/P EST SF 10 MIN: CPT | Performed by: INTERNAL MEDICINE

## 2017-04-11 NOTE — PROGRESS NOTES
CHANO    Jesus Fitch is a 31year old female with Hgb SS, hospitalized 12/31/2016 at [address] [place] [address]. She is currently feeling well, living in [place] with extended family she and her partner plan to move to [place] [address].   She does not report a recent 4 (four) hours as needed for Pain. Disp: 60 tablet Rfl: 0   levETIRAcetam (KEPPRA) 500 MG Oral Tab Take 1 tablet (500 mg total) by mouth 2 (two) times daily.  Disp: 60 tablet Rfl: 0   Promethazine HCl 25 MG Oral Tab Take 1 tablet (25 mg total) by mouth ever possible. Additional information could be obtained from Ermelinda Quan at [phone] regarding her past medical history.     Patient is a potential candidate to be cared for a considered for bone marrow transplant at [place] [address]

## 2017-04-11 NOTE — PROGRESS NOTES
HPI       Review of Systems:     Review of Systems        Current Outpatient Prescriptions:  hydroxyurea 500 MG Oral Cap Take one 500 mg by mouth two times a day.  Disp: 60 capsule Rfl: 0   folic acid 1 MG Oral Tab Take 1 tablet (1 mg total) by mouth narda found. No orders of the defined types were placed in this encounter. Results From Past 48 Hours:  No results found for this or any previous visit (from the past 48 hour(s)).     Meds This Visit:        Imaging & Referrals:  None   No orders of the

## 2017-04-29 LAB
ALBUMIN SERPL-MCNC: 3.5 GM/DL (ref 3.6–5.1)
ALBUMIN/GLOB SERPL: 0.8 {RATIO} (ref 1–2.4)
ALP SERPL-CCNC: 199 UNIT/L (ref 45–117)
ALT SERPL-CCNC: 87 UNIT/L
AMORPH SED URNS QL MICRO: ABNORMAL
ANALYZER ANC (IANC): ABNORMAL THOUSAND/MCL (ref 140–450)
ANION GAP SERPL CALC-SCNC: 14 MMOL/L (ref 10–20)
APPEARANCE UR: CLEAR
AST SERPL-CCNC: 117 UNIT/L
BACTERIA #/AREA URNS HPF: ABNORMAL /HPF
BASOPHILS # BLD: 0 THOUSAND/MCL (ref 0–0.3)
BASOPHILS NFR BLD: 0 %
BILIRUB SERPL-MCNC: 4.2 MG/DL (ref 0.2–1)
BILIRUB UR QL: NEGATIVE
BUN SERPL-MCNC: 12 MG/DL (ref 6–20)
BUN/CREAT SERPL: 19 (ref 7–25)
CALCIUM SERPL-MCNC: 8.1 MG/DL (ref 8.4–10.2)
CAOX CRY URNS QL MICRO: ABNORMAL
CHLORIDE: 104 MMOL/L (ref 98–107)
CO2 SERPL-SCNC: 23 MMOL/L (ref 21–32)
COLOR UR: ABNORMAL
CREAT SERPL-MCNC: 0.62 MG/DL (ref 0.51–0.95)
DIFFERENTIAL METHOD BLD: ABNORMAL
EOSINOPHIL # BLD: 0.2 THOUSAND/MCL (ref 0.1–0.5)
EOSINOPHIL NFR BLD: 1 %
EPITH CASTS #/AREA URNS LPF: ABNORMAL /[LPF]
ERYTHROCYTE [DISTWIDTH] IN BLOOD: 22.4 % (ref 11–15)
FATTY CASTS #/AREA URNS LPF: ABNORMAL /[LPF]
GLOBULIN SER-MCNC: 4.5 GM/DL (ref 2–4)
GLUCOSE SERPL-MCNC: 98 MG/DL (ref 65–99)
GLUCOSE UR-MCNC: NEGATIVE MG/DL
GRAN CASTS #/AREA URNS LPF: ABNORMAL /[LPF]
HCG POINT OF CARE (5HGRST): POSITIVE
HEMATOCRIT: 19.7 % (ref 36–46.5)
HG POINT OF CARE QC: NORMAL
HGB BLD-MCNC: 7 GM/DL (ref 12–15.5)
HGB UR QL: ABNORMAL
HYALINE CASTS #/AREA URNS LPF: ABNORMAL /LPF (ref 0–5)
HYPOCHROMIA (HYPOC): ABNORMAL
KETONES UR-MCNC: NEGATIVE MG/DL
LARGE PLATELETS (PLTL): PRESENT
LEUKOCYTE ESTERASE UR QL STRIP: NEGATIVE
LYMPHOCYTES # BLD: 6.4 THOUSAND/MCL (ref 1–4.8)
LYMPHOCYTES NFR BLD: 40 %
MACROCYTOSIS (MACRO): ABNORMAL
MCH RBC QN AUTO: 35 PG (ref 26–34)
MCHC RBC AUTO-ENTMCNC: 35.5 GM/DL (ref 32–36.5)
MCV RBC AUTO: 98.5 FL (ref 78–100)
MICROCYTOSIS (MICY): ABNORMAL
MIXED CELL CASTS #/AREA URNS LPF: ABNORMAL /[LPF]
MONOCYTES # BLD: 1.9 THOUSAND/MCL (ref 0.3–0.9)
MONOCYTES NFR BLD: 12 %
MUCOUS THREADS URNS QL MICRO: ABNORMAL
NEUTROPHILS # BLD: 7.6 THOUSAND/MCL (ref 1.8–7.7)
NEUTS SEG NFR BLD: 47 %
NITRITE UR QL: NEGATIVE
NRBC BLD MANUAL-RTO: 4 /100 WBC
OVALOCYTES (OVALO): ABNORMAL
PATH REV BLD -IMP: ABNORMAL
PH UR: 6 UNIT (ref 5–7)
PLATELET # BLD: 328 THOUSAND/MCL (ref 140–450)
POLYCHROMASIA (POLY): ABNORMAL
POTASSIUM SERPL-SCNC: 3.7 MMOL/L (ref 3.4–5.1)
PROT SERPL-MCNC: 8 GM/DL (ref 6.4–8.2)
PROT UR QL: NEGATIVE MG/DL
RBC # BLD: 2 MILLION/MCL (ref 4–5.2)
RBC #/AREA URNS HPF: ABNORMAL /HPF (ref 0–3)
RBC CASTS #/AREA URNS LPF: ABNORMAL /[LPF]
RENAL EPI CELLS #/AREA URNS HPF: ABNORMAL /[HPF]
RETICULOCYTE HEMOGLOBIN CONTENT (RETHE): 34 PG (ref 28.6–36.3)
SICKLE CELLS (SICL): ABNORMAL
SODIUM SERPL-SCNC: 137 MMOL/L (ref 135–145)
SP GR UR: 1.01 (ref 1–1.03)
SPECIMEN SOURCE: ABNORMAL
SPERM URNS QL MICRO: ABNORMAL
SQUAMOUS #/AREA URNS HPF: ABNORMAL /HPF (ref 0–5)
STOMATOCYTES (STOM): ABNORMAL
T VAGINALIS URNS QL MICRO: ABNORMAL
TARGET CELLS (TARGET): ABNORMAL
TOXIC VACUOLATION (TOXV): PRESENT
TRI-PHOS CRY URNS QL MICRO: ABNORMAL
URATE CRY URNS QL MICRO: ABNORMAL
URINE REFLEX: ABNORMAL
URNS CMNT MICRO: ABNORMAL
UROBILINOGEN UR QL: 2 MG/DL (ref 0–1)
WAXY CASTS #/AREA URNS LPF: ABNORMAL /[LPF]
WBC # BLD: 16.1 THOUSAND/MCL (ref 4.2–11)
WBC #/AREA URNS HPF: ABNORMAL /HPF (ref 0–5)
WBC CASTS #/AREA URNS LPF: ABNORMAL /[LPF]
YEAST HYPHAE URNS QL MICRO: ABNORMAL
YEAST URNS QL MICRO: ABNORMAL

## 2017-04-30 ENCOUNTER — HOSPITAL (OUTPATIENT)
Dept: OTHER | Age: 27
End: 2017-04-30
Attending: INTERNAL MEDICINE

## 2017-04-30 LAB
ALBUMIN SERPL-MCNC: 3.2 GM/DL (ref 3.6–5.1)
ALBUMIN/GLOB SERPL: 0.8 {RATIO} (ref 1–2.4)
ALP SERPL-CCNC: 179 UNIT/L (ref 45–117)
ALT SERPL-CCNC: 86 UNIT/L
ANALYZER ANC (IANC): ABNORMAL
ANION GAP SERPL CALC-SCNC: 14 MMOL/L (ref 10–20)
AST SERPL-CCNC: 114 UNIT/L
BASOPHILIC STIPLING (BAST): PRESENT
BASOPHILS # BLD: 0 THOUSAND/MCL (ref 0–0.3)
BASOPHILS NFR BLD: 0 %
BILIRUB SERPL-MCNC: 3.8 MG/DL (ref 0.2–1)
BUN SERPL-MCNC: 10 MG/DL (ref 6–20)
BUN/CREAT SERPL: 15 (ref 7–25)
CALCIUM SERPL-MCNC: 8.1 MG/DL (ref 8.4–10.2)
CHLORIDE: 107 MMOL/L (ref 98–107)
CO2 SERPL-SCNC: 22 MMOL/L (ref 21–32)
CREAT SERPL-MCNC: 0.65 MG/DL (ref 0.51–0.95)
DIFFERENTIAL METHOD BLD: ABNORMAL
EOSINOPHIL # BLD: 0.3 THOUSAND/MCL (ref 0.1–0.5)
EOSINOPHIL NFR BLD: 2 %
ERYTHROCYTE [DISTWIDTH] IN BLOOD: 22.3 % (ref 11–15)
FOLATE SERPL-MCNC: 4 NG/ML
GLOBULIN SER-MCNC: 4.2 GM/DL (ref 2–4)
GLUCOSE SERPL-MCNC: 88 MG/DL (ref 65–99)
HEMATOCRIT: 18.4 % (ref 36–46.5)
HEMATOCRIT: 21.2 % (ref 36–46.5)
HGB BLD-MCNC: 6.5 GM/DL (ref 12–15.5)
HGB BLD-MCNC: 7.7 GM/DL (ref 12–15.5)
HYPOCHROMIA (HYPOC): ABNORMAL
IMMATURE RETIC FRACTION: 22.1 % (ref 1.5–16)
LARGE PLATELETS (PLTL): PRESENT
LDH SERPL-CCNC: 502 UNIT/L (ref 82–240)
LYMPHOCYTES # BLD: 4.9 THOUSAND/MCL (ref 1–4.8)
LYMPHOCYTES NFR BLD: 32 %
MACROCYTOSIS (MACRO): ABNORMAL
MCH RBC QN AUTO: 34.9 PG (ref 26–34)
MCHC RBC AUTO-ENTMCNC: 35.3 GM/DL (ref 32–36.5)
MCV RBC AUTO: 98.9 FL (ref 78–100)
MICROCYTOSIS (MICY): ABNORMAL
MONOCYTES # BLD: 2 THOUSAND/MCL (ref 0.3–0.9)
MONOCYTES NFR BLD: 13 %
NEUTROPHILS # BLD: 8.1 THOUSAND/MCL (ref 1.8–7.7)
NEUTS BAND NFR BLD: 1 %
NEUTS SEG NFR BLD: 52 %
NRBC BLD MANUAL-RTO: 2 /100 WBC
OVALOCYTES (OVALO): ABNORMAL
PAPPENHEIMER (PASI): PRESENT
PATH REV BLD -IMP: ABNORMAL
PLATELET # BLD: 310 THOUSAND/MCL (ref 140–450)
POLYCHROMASIA (POLY): ABNORMAL
POTASSIUM SERPL-SCNC: 3.7 MMOL/L (ref 3.4–5.1)
PROT SERPL-MCNC: 7.4 GM/DL (ref 6.4–8.2)
RBC # BLD: 1.86 MILLION/MCL (ref 4–5.2)
RETICS #: 495 THOUSAND/MCL (ref 10–120)
RETICS/RBC NFR: 26.8 % (ref 0.3–2.5)
SICKLE CELLS (SICL): ABNORMAL
SODIUM SERPL-SCNC: 139 MMOL/L (ref 135–145)
TARGET CELLS (TARGET): ABNORMAL
TOXIC GRANULATION (TOXIC): PRESENT
VIT B12 SERPL-MCNC: 573 PG/ML (ref 211–911)
WBC # BLD: 15.3 THOUSAND/MCL (ref 4.2–11)

## 2017-05-01 LAB
ALBUMIN SERPL-MCNC: 3.2 GM/DL (ref 3.6–5.1)
ALBUMIN/GLOB SERPL: 0.8 {RATIO} (ref 1–2.4)
ALKALINE HGB ELECTR: NORMAL
ALP SERPL-CCNC: 185 UNIT/L (ref 45–117)
ALT SERPL-CCNC: 85 UNIT/L
ANALYZER ANC (IANC): ABNORMAL
ANION GAP SERPL CALC-SCNC: 15 MMOL/L (ref 10–20)
AST SERPL-CCNC: 130 UNIT/L
BASOPHILS # BLD: 0.1 THOUSAND/MCL (ref 0–0.3)
BASOPHILS NFR BLD: 1 %
BILIRUB SERPL-MCNC: 4.1 MG/DL (ref 0.2–1)
BUN SERPL-MCNC: 7 MG/DL (ref 6–20)
BUN/CREAT SERPL: 14 (ref 7–25)
CALCIUM SERPL-MCNC: 7.9 MG/DL (ref 8.4–10.2)
CHLORIDE: 106 MMOL/L (ref 98–107)
CO2 SERPL-SCNC: 20 MMOL/L (ref 21–32)
CREAT SERPL-MCNC: 0.5 MG/DL (ref 0.51–0.95)
DIFFERENTIAL METHOD BLD: ABNORMAL
EOSINOPHIL # BLD: 0.2 THOUSAND/MCL (ref 0.1–0.5)
EOSINOPHIL NFR BLD: 2 %
ERYTHROCYTE [DISTWIDTH] IN BLOOD: 21.1 % (ref 11–15)
FERRITIN SERPL-MCNC: 6451 NG/ML (ref 8–252)
GLOBULIN SER-MCNC: 4.2 GM/DL (ref 2–4)
GLUCOSE SERPL-MCNC: 81 MG/DL (ref 65–99)
HEMATOCRIT: 21.8 % (ref 36–46.5)
HGB A1 MFR BLD ELPH: 26.5 % (ref 96.4–98.2)
HGB A2 MFR BLD ELPH: 2.9 % (ref 1.8–3.4)
HGB BLD-MCNC: 7.9 GM/DL (ref 12–15.5)
HGB C MFR BLD ELPH: ABNORMAL %
HGB F MFR BLD ELPH: 7.8 % (ref 0–2)
HGB FRACT BLD ELPH CITRATE-IMP: NORMAL
HGB FRACT BLD ELPH-IMP: ABNORMAL
HGB OTHER MFR BLD ELPH: ABNORMAL %
HGB S MFR BLD ELPH: 62.8 %
LDH SERPL-CCNC: 552 UNIT/L (ref 82–240)
LYMPHOCYTES # BLD: 3.8 THOUSAND/MCL (ref 1–4.8)
LYMPHOCYTES NFR BLD: 28 %
MACROCYTOSIS (MACRO): ABNORMAL
MCH RBC QN AUTO: 34.8 PG (ref 26–34)
MCHC RBC AUTO-ENTMCNC: 36.2 GM/DL (ref 32–36.5)
MCV RBC AUTO: 96 FL (ref 78–100)
MONOCYTES # BLD: 2 THOUSAND/MCL (ref 0.3–0.9)
MONOCYTES NFR BLD: 15 %
NEUTROPHILS # BLD: 7.5 THOUSAND/MCL (ref 1.8–7.7)
NEUTROPHILS NFR BLD: 54 %
NEUTS SEG NFR BLD: ABNORMAL %
OVALOCYTES (OVALO): ABNORMAL
PATHOLOGIST NAME: ABNORMAL
PERCENT NRBC: 3
PLATELET # BLD: 321 THOUSAND/MCL (ref 140–450)
POLYCHROMASIA (POLY): ABNORMAL
POTASSIUM SERPL-SCNC: 4.1 MMOL/L (ref 3.4–5.1)
PROT SERPL-MCNC: 7.4 GM/DL (ref 6.4–8.2)
RBC # BLD: 2.27 MILLION/MCL (ref 4–5.2)
SICKLE CELLS (SICL): ABNORMAL
SODIUM SERPL-SCNC: 137 MMOL/L (ref 135–145)
TARGET CELLS (TARGET): ABNORMAL
WBC # BLD: 13.6 THOUSAND/MCL (ref 4.2–11)

## 2017-05-02 LAB
ALBUMIN SERPL-MCNC: 3 GM/DL (ref 3.6–5.1)
ALBUMIN/GLOB SERPL: 0.8 {RATIO} (ref 1–2.4)
ALP SERPL-CCNC: 175 UNIT/L (ref 45–117)
ALT SERPL-CCNC: 75 UNIT/L
ANALYZER ANC (IANC): ABNORMAL
ANION GAP SERPL CALC-SCNC: 13 MMOL/L (ref 10–20)
AST SERPL-CCNC: 102 UNIT/L
BILIRUB SERPL-MCNC: 3.7 MG/DL (ref 0.2–1)
BUN SERPL-MCNC: 9 MG/DL (ref 6–20)
BUN/CREAT SERPL: 17 (ref 7–25)
CALCIUM SERPL-MCNC: 7.8 MG/DL (ref 8.4–10.2)
CHLORIDE: 108 MMOL/L (ref 98–107)
CO2 SERPL-SCNC: 22 MMOL/L (ref 21–32)
CREAT SERPL-MCNC: 0.53 MG/DL (ref 0.51–0.95)
ERYTHROCYTE [DISTWIDTH] IN BLOOD: 21.2 % (ref 11–15)
GLOBULIN SER-MCNC: 3.9 GM/DL (ref 2–4)
GLUCOSE SERPL-MCNC: 82 MG/DL (ref 65–99)
HEMATOCRIT: 19.6 % (ref 36–46.5)
HGB BLD-MCNC: 7 GM/DL (ref 12–15.5)
MCH RBC QN AUTO: 33.7 PG (ref 26–34)
MCHC RBC AUTO-ENTMCNC: 35.7 GM/DL (ref 32–36.5)
MCV RBC AUTO: 94.2 FL (ref 78–100)
PLATELET # BLD: 291 THOUSAND/MCL (ref 140–450)
POTASSIUM SERPL-SCNC: 3.9 MMOL/L (ref 3.4–5.1)
PROT SERPL-MCNC: 6.9 GM/DL (ref 6.4–8.2)
RBC # BLD: 2.08 MILLION/MCL (ref 4–5.2)
SODIUM SERPL-SCNC: 139 MMOL/L (ref 135–145)
WBC # BLD: 13.5 THOUSAND/MCL (ref 4.2–11)

## 2017-05-03 LAB
ALBUMIN SERPL-MCNC: 3.1 GM/DL (ref 3.6–5.1)
ALBUMIN/GLOB SERPL: 0.7 {RATIO} (ref 1–2.4)
ALP SERPL-CCNC: 195 UNIT/L (ref 45–117)
ALT SERPL-CCNC: 82 UNIT/L
ANALYZER ANC (IANC): ABNORMAL THOUSAND/MCL (ref 140–450)
ANION GAP SERPL CALC-SCNC: 12 MMOL/L (ref 10–20)
AST SERPL-CCNC: 115 UNIT/L
BASOPHILS # BLD: 0 THOUSAND/MCL (ref 0–0.3)
BASOPHILS NFR BLD: 0 %
BILIRUB SERPL-MCNC: 3.8 MG/DL (ref 0.2–1)
BUN SERPL-MCNC: 8 MG/DL (ref 6–20)
BUN/CREAT SERPL: 15 (ref 7–25)
CALCIUM SERPL-MCNC: 7.6 MG/DL (ref 8.4–10.2)
CHLORIDE: 107 MMOL/L (ref 98–107)
CO2 SERPL-SCNC: 23 MMOL/L (ref 21–32)
CREAT SERPL-MCNC: 0.52 MG/DL (ref 0.51–0.95)
DIFFERENTIAL METHOD BLD: ABNORMAL
EOSINOPHIL # BLD: 0.2 THOUSAND/MCL (ref 0.1–0.5)
EOSINOPHIL NFR BLD: 2 %
ERYTHROCYTE [DISTWIDTH] IN BLOOD: 21.2 % (ref 11–15)
GLOBULIN SER-MCNC: 4.2 GM/DL (ref 2–4)
GLUCOSE SERPL-MCNC: 90 MG/DL (ref 65–99)
HEMATOCRIT: 20.1 % (ref 36–46.5)
HGB BLD-MCNC: 7.2 GM/DL (ref 12–15.5)
IMMATURE RETIC FRACTION: 18.5 % (ref 1.5–16)
LDH SERPL-CCNC: 541 UNIT/L (ref 82–240)
LYMPHOCYTES # BLD: 4.6 THOUSAND/MCL (ref 1–4.8)
LYMPHOCYTES NFR BLD: 36 %
MCH RBC QN AUTO: 34.3 PG (ref 26–34)
MCHC RBC AUTO-ENTMCNC: 35.8 GM/DL (ref 32–36.5)
MCV RBC AUTO: 95.7 FL (ref 78–100)
MONOCYTES # BLD: 1.8 THOUSAND/MCL (ref 0.3–0.9)
MONOCYTES NFR BLD: 14 %
NEUTROPHILS # BLD: 6.3 THOUSAND/MCL (ref 1.8–7.7)
NEUTROPHILS NFR BLD: 48 %
NEUTS SEG NFR BLD: ABNORMAL %
PERCENT NRBC: ABNORMAL
PLATELET # BLD: 307 THOUSAND/MCL (ref 140–450)
POTASSIUM SERPL-SCNC: 4.2 MMOL/L (ref 3.4–5.1)
PROT SERPL-MCNC: 7.3 GM/DL (ref 6.4–8.2)
RBC # BLD: 2.1 MILLION/MCL (ref 4–5.2)
RETICS #: 428 THOUSAND/MCL (ref 10–120)
RETICS/RBC NFR: 19.9 % (ref 0.3–2.5)
SODIUM SERPL-SCNC: 138 MMOL/L (ref 135–145)
WBC # BLD: 12.9 THOUSAND/MCL (ref 4.2–11)

## 2017-05-04 LAB
ANALYZER ANC (IANC): ABNORMAL
BASOPHILS # BLD: 0.1 THOUSAND/MCL (ref 0–0.3)
BASOPHILS NFR BLD: 1 %
DIFFERENTIAL METHOD BLD: ABNORMAL
EOSINOPHIL # BLD: 0.2 THOUSAND/MCL (ref 0.1–0.5)
EOSINOPHIL NFR BLD: 2 %
ERYTHROCYTE [DISTWIDTH] IN BLOOD: 19.7 % (ref 11–15)
HEMATOCRIT: 18.4 % (ref 36–46.5)
HGB BLD-MCNC: 6.7 GM/DL (ref 12–15.5)
LYMPHOCYTES # BLD: 4.3 THOUSAND/MCL (ref 1–4.8)
LYMPHOCYTES NFR BLD: 36 %
MCH RBC QN AUTO: 34.4 PG (ref 26–34)
MCHC RBC AUTO-ENTMCNC: 36.4 GM/DL (ref 32–36.5)
MCV RBC AUTO: 94.4 FL (ref 78–100)
MONOCYTES # BLD: 2 THOUSAND/MCL (ref 0.3–0.9)
MONOCYTES NFR BLD: 17 %
NEUTROPHILS # BLD: 5.4 THOUSAND/MCL (ref 1.8–7.7)
NEUTROPHILS NFR BLD: 44 %
NEUTS SEG NFR BLD: ABNORMAL %
PERCENT NRBC: ABNORMAL
PLATELET # BLD: 293 THOUSAND/MCL (ref 140–450)
RBC # BLD: 1.95 MILLION/MCL (ref 4–5.2)
WBC # BLD: 12 THOUSAND/MCL (ref 4.2–11)

## 2017-05-05 LAB
ANALYZER ANC (IANC): ABNORMAL
ERYTHROCYTE [DISTWIDTH] IN BLOOD: 20 % (ref 11–15)
HEMATOCRIT: 20.7 % (ref 36–46.5)
HGB BLD-MCNC: 7.4 GM/DL (ref 12–15.5)
MCH RBC QN AUTO: 32.9 PG (ref 26–34)
MCHC RBC AUTO-ENTMCNC: 35.7 GM/DL (ref 32–36.5)
MCV RBC AUTO: 92 FL (ref 78–100)
PLATELET # BLD: 280 THOUSAND/MCL (ref 140–450)
RBC # BLD: 2.25 MILLION/MCL (ref 4–5.2)
WBC # BLD: 12.6 THOUSAND/MCL (ref 4.2–11)

## 2017-05-06 LAB
ANALYZER ANC (IANC): ABNORMAL
BASOPHILS # BLD: 0.1 THOUSAND/MCL (ref 0–0.3)
BASOPHILS NFR BLD: 1 %
DIFFERENTIAL METHOD BLD: ABNORMAL
EOSINOPHIL # BLD: 0.1 THOUSAND/MCL (ref 0.1–0.5)
EOSINOPHIL NFR BLD: 1 %
ERYTHROCYTE [DISTWIDTH] IN BLOOD: 19.7 % (ref 11–15)
HEMATOCRIT: 22.2 % (ref 36–46.5)
HGB BLD-MCNC: 7.7 GM/DL (ref 12–15.5)
LYMPHOCYTES # BLD: 3.8 THOUSAND/MCL (ref 1–4.8)
LYMPHOCYTES NFR BLD: 32 %
MCH RBC QN AUTO: 32.2 PG (ref 26–34)
MCHC RBC AUTO-ENTMCNC: 34.7 GM/DL (ref 32–36.5)
MCV RBC AUTO: 92.9 FL (ref 78–100)
MONOCYTES # BLD: 2 THOUSAND/MCL (ref 0.3–0.9)
MONOCYTES NFR BLD: 17 %
NEUTROPHILS # BLD: 5.8 THOUSAND/MCL (ref 1.8–7.7)
NEUTROPHILS NFR BLD: 49 %
NEUTS SEG NFR BLD: ABNORMAL %
PERCENT NRBC: ABNORMAL
PLATELET # BLD: 309 THOUSAND/MCL (ref 140–450)
RBC # BLD: 2.39 MILLION/MCL (ref 4–5.2)
WBC # BLD: 11.8 THOUSAND/MCL (ref 4.2–11)

## 2018-07-21 ENCOUNTER — HOSPITAL ENCOUNTER (INPATIENT)
Facility: HOSPITAL | Age: 28
LOS: 9 days | Discharge: HOME | DRG: 812 | End: 2018-07-30
Attending: PHYSICIAN ASSISTANT | Admitting: HOSPITALIST
Payer: MEDICAID

## 2018-07-21 ENCOUNTER — APPOINTMENT (OUTPATIENT)
Dept: GENERAL RADIOLOGY | Facility: HOSPITAL | Age: 28
DRG: 812 | End: 2018-07-21
Attending: PHYSICIAN ASSISTANT
Payer: MEDICAID

## 2018-07-21 DIAGNOSIS — E83.111 IRON OVERLOAD DUE TO REPEATED RED BLOOD CELL TRANSFUSIONS: ICD-10-CM

## 2018-07-21 DIAGNOSIS — R09.02 HYPOXIA: ICD-10-CM

## 2018-07-21 DIAGNOSIS — D57.00 SICKLE CELL PAIN CRISIS (HCC): Primary | ICD-10-CM

## 2018-07-21 DIAGNOSIS — Z86.19 HISTORY OF HEPATITIS C: ICD-10-CM

## 2018-07-21 LAB
ALBUMIN SERPL BCP-MCNC: 3.4 G/DL (ref 3.5–4.8)
ALP SERPL-CCNC: 198 U/L (ref 32–100)
ALT SERPL-CCNC: 86 U/L (ref 14–54)
ANION GAP SERPL CALC-SCNC: 9 MMOL/L (ref 0–18)
ANTIBODY SCREEN: NEGATIVE
AST SERPL-CCNC: 146 U/L (ref 15–41)
B-HCG UR QL: NEGATIVE
BILIRUB DIRECT SERPL-MCNC: 2 MG/DL (ref 0–0.2)
BILIRUB SERPL-MCNC: 6.5 MG/DL (ref 0.3–1.2)
BUN SERPL-MCNC: 6 MG/DL (ref 8–20)
BUN/CREAT SERPL: 9 (ref 10–20)
CALCIUM SERPL-MCNC: 8.6 MG/DL (ref 8.5–10.5)
CHLORIDE SERPL-SCNC: 108 MMOL/L (ref 95–110)
CO2 SERPL-SCNC: 21 MMOL/L (ref 22–32)
CREAT SERPL-MCNC: 0.67 MG/DL (ref 0.5–1.5)
GLUCOSE SERPL-MCNC: 105 MG/DL (ref 70–99)
LIPASE SERPL-CCNC: 46 U/L (ref 22–51)
OSMOLALITY UR CALC.SUM OF ELEC: 284 MOSM/KG (ref 275–295)
POTASSIUM SERPL-SCNC: 3.4 MMOL/L (ref 3.3–5.1)
PROT SERPL-MCNC: 7.6 G/DL (ref 5.9–8.4)
RETICS/RBC NFR AUTO: 18.9 % (ref 0.5–1.5)
RH BLOOD TYPE: POSITIVE
SODIUM SERPL-SCNC: 138 MMOL/L (ref 136–144)

## 2018-07-21 PROCEDURE — 99223 1ST HOSP IP/OBS HIGH 75: CPT | Performed by: HOSPITALIST

## 2018-07-21 PROCEDURE — 99223 1ST HOSP IP/OBS HIGH 75: CPT | Performed by: INTERNAL MEDICINE

## 2018-07-21 PROCEDURE — 71046 X-RAY EXAM CHEST 2 VIEWS: CPT | Performed by: PHYSICIAN ASSISTANT

## 2018-07-21 RX ORDER — HYDROCODONE BITARTRATE AND ACETAMINOPHEN 5; 325 MG/1; MG/1
1 TABLET ORAL EVERY 4 HOURS PRN
Status: DISCONTINUED | OUTPATIENT
Start: 2018-07-21 | End: 2018-07-28

## 2018-07-21 RX ORDER — DIPHENHYDRAMINE HCL 25 MG
25 CAPSULE ORAL EVERY 4 HOURS PRN
Status: DISCONTINUED | OUTPATIENT
Start: 2018-07-21 | End: 2018-07-30

## 2018-07-21 RX ORDER — SODIUM CHLORIDE 0.9 % (FLUSH) 0.9 %
3 SYRINGE (ML) INJECTION AS NEEDED
Status: DISCONTINUED | OUTPATIENT
Start: 2018-07-21 | End: 2018-07-30

## 2018-07-21 RX ORDER — HYDROMORPHONE HYDROCHLORIDE 1 MG/ML
1 INJECTION, SOLUTION INTRAMUSCULAR; INTRAVENOUS; SUBCUTANEOUS EVERY 2 HOUR PRN
Status: DISCONTINUED | OUTPATIENT
Start: 2018-07-21 | End: 2018-07-29

## 2018-07-21 RX ORDER — HEPARIN SODIUM 5000 [USP'U]/ML
5000 INJECTION, SOLUTION INTRAVENOUS; SUBCUTANEOUS EVERY 12 HOURS SCHEDULED
Status: DISCONTINUED | OUTPATIENT
Start: 2018-07-21 | End: 2018-07-23

## 2018-07-21 RX ORDER — ACETAMINOPHEN 325 MG/1
650 TABLET ORAL EVERY 6 HOURS PRN
Status: DISCONTINUED | OUTPATIENT
Start: 2018-07-21 | End: 2018-07-30

## 2018-07-21 RX ORDER — SODIUM CHLORIDE 9 MG/ML
1000 INJECTION, SOLUTION INTRAVENOUS ONCE
Status: COMPLETED | OUTPATIENT
Start: 2018-07-21 | End: 2018-07-21

## 2018-07-21 RX ORDER — HYDROMORPHONE HYDROCHLORIDE 1 MG/ML
1 INJECTION, SOLUTION INTRAMUSCULAR; INTRAVENOUS; SUBCUTANEOUS ONCE
Status: COMPLETED | OUTPATIENT
Start: 2018-07-21 | End: 2018-07-21

## 2018-07-21 RX ORDER — METOCLOPRAMIDE HYDROCHLORIDE 5 MG/ML
10 INJECTION INTRAMUSCULAR; INTRAVENOUS EVERY 6 HOURS PRN
Status: DISCONTINUED | OUTPATIENT
Start: 2018-07-21 | End: 2018-07-30

## 2018-07-21 RX ORDER — SODIUM CHLORIDE 9 MG/ML
INJECTION, SOLUTION INTRAVENOUS CONTINUOUS
Status: DISCONTINUED | OUTPATIENT
Start: 2018-07-21 | End: 2018-07-30

## 2018-07-21 RX ORDER — ACETAMINOPHEN 325 MG/1
650 TABLET ORAL EVERY 4 HOURS PRN
Status: DISCONTINUED | OUTPATIENT
Start: 2018-07-21 | End: 2018-07-30

## 2018-07-21 RX ORDER — HYDROCODONE BITARTRATE AND ACETAMINOPHEN 5; 325 MG/1; MG/1
2 TABLET ORAL EVERY 4 HOURS PRN
Status: DISCONTINUED | OUTPATIENT
Start: 2018-07-21 | End: 2018-07-28

## 2018-07-21 RX ORDER — ONDANSETRON 2 MG/ML
4 INJECTION INTRAMUSCULAR; INTRAVENOUS EVERY 6 HOURS PRN
Status: CANCELLED | OUTPATIENT
Start: 2018-07-21

## 2018-07-21 NOTE — PLAN OF CARE
MUSCULOSKELETAL - ADULT    • Return mobility to safest level of function Progressing        PAIN - ADULT    • Verbalizes/displays adequate comfort level or patient's stated pain goal Progressing        Patient Centered Care    • Patient preferences are celena

## 2018-07-21 NOTE — ED PROVIDER NOTES
Patient Seen in: [facility] AND [facility] Emergency Department    History   Patient presents with:  Sickle Cell (hematologic)    Stated Complaint: Sikle Cell    HPI      76-year-old female with history of sickle cell disease and hepatitis C presents with chief ED Triage Vitals [07/21/18 1121]  BP: 126/73  Pulse: 102  Resp: 18  Temp: 99.1 °F (37.3 °C)  Temp src: Oral  SpO2: 93 %  O2 Device: None (Room air)    Current:/65   Pulse 96   Temp 99.1 °F (37.3 °C) (Oral)   Resp 22   Ht 175.3 cm (5' 9\")   Wt 70. 8 following:     Retic% 18.9 (*)     All other components within normal limits   HEPATIC FUNCTION PANEL (7) - Abnormal; Notable for the following:      (*)     ALT 86 (*)     Alkaline Phosphatase 198 (*)     Bilirubin, Total 6.5 (*)     Albumin 3.4 [place] on 7/21/2018 at 14:00            Patient found to be hypoxic at 85% on room air. O2 via nasal cannula administered. Remainder of physical exam remained stable over serial reexaminations as previously documented.   Results reviewed and need for admission d

## 2018-07-21 NOTE — H&P
[facility]    PATIENT'S NAME: Radhames Prabhu   ATTENDING PHYSICIAN: Brigette Sparks MD   PATIENT ACCOUNT#:   [id number]    LOCATION:  [address]  MEDICAL RECORD #:   [id number]       YOB: 1990  ADMISSION DATE:       07/2 pain; has been progressive for the last 4 days. She denies any prodromal symptoms, but during her 4-day crisis she has been noticing low-grade temperature. Patient denies any cough. No abdominal pain, but she feels short of breath at times.   Other 12-po

## 2018-07-21 NOTE — PROGRESS NOTES
Consult dictated.     Rec:    IVF with NS minimum 150ml/hr for now  If Hgb <6 in AM transfuse 1 unit leukoreduced PRBCS  chemical DVT proph

## 2018-07-21 NOTE — CONSULTS
[facility] [place] [facility]    PATIENT'S NAME: Shari BOYER Nurse   ATTENDING PHYSICIAN: Vangie Stacy MD   CONSULTING PHYSICIAN: Gregoria Huntley.  Jasper Guevara MD   PATIENT ACCOUNT#:   [id number]    LOCATION:  [address] [place] [address] #:   [id number]       DATE OF BIRTH PHYSICAL EXAMINATION:    GENERAL:  No acute distress. Alert and oriented. VITAL SIGNS:  Temperature 37.3, pulse 102, respiratory rate 18, blood pressure 126/73, pulse oximetry 93%. Weight is 70.8 kg. HEENT:  Moist mucous membranes.   Oropharynx manju

## 2018-07-22 LAB
ALBUMIN SERPL BCP-MCNC: 3.2 G/DL (ref 3.5–4.8)
ALBUMIN/GLOB SERPL: 0.8 {RATIO} (ref 1–2)
ALP SERPL-CCNC: 178 U/L (ref 32–100)
ALT SERPL-CCNC: 79 U/L (ref 14–54)
ANION GAP SERPL CALC-SCNC: 5 MMOL/L (ref 0–18)
AST SERPL-CCNC: 125 U/L (ref 15–41)
BASOPHILS # BLD: 0.2 K/UL (ref 0–0.2)
BASOPHILS NFR BLD: 1 %
BILIRUB SERPL-MCNC: 5.3 MG/DL (ref 0.3–1.2)
BUN SERPL-MCNC: 6 MG/DL (ref 8–20)
BUN/CREAT SERPL: 8 (ref 10–20)
CALCIUM SERPL-MCNC: 8.3 MG/DL (ref 8.5–10.5)
CHLORIDE SERPL-SCNC: 111 MMOL/L (ref 95–110)
CO2 SERPL-SCNC: 23 MMOL/L (ref 22–32)
CREAT SERPL-MCNC: 0.75 MG/DL (ref 0.5–1.5)
EOSINOPHIL # BLD: 0.2 K/UL (ref 0–0.7)
EOSINOPHIL NFR BLD: 1 %
ERYTHROCYTE [DISTWIDTH] IN BLOOD BY AUTOMATED COUNT: 20.5 % (ref 11–15)
FERRITIN SERPL IA-MCNC: 4117 NG/ML (ref 11–307)
GLOBULIN PLAS-MCNC: 4 G/DL (ref 2.5–3.7)
GLUCOSE SERPL-MCNC: 90 MG/DL (ref 70–99)
HCT VFR BLD AUTO: 17 % (ref 35–48)
HGB BLD-MCNC: 6 G/DL (ref 12–16)
LYMPHOCYTES # BLD: 6.2 K/UL (ref 1–4)
LYMPHOCYTES NFR BLD: 39 %
MCH RBC QN AUTO: 38 PG (ref 27–32)
MCHC RBC AUTO-ENTMCNC: 35.1 G/DL (ref 32–37)
MCV RBC AUTO: 108.2 FL (ref 80–100)
MONOCYTES # BLD: 1.8 K/UL (ref 0–1)
MONOCYTES NFR BLD: 11 %
NEUTROPHILS # BLD AUTO: 7.7 K/UL (ref 1.8–7.7)
NEUTROPHILS NFR BLD: 48 %
OSMOLALITY UR CALC.SUM OF ELEC: 285 MOSM/KG (ref 275–295)
PLATELET # BLD AUTO: 243 K/UL (ref 140–400)
PMV BLD AUTO: 8.6 FL (ref 7.4–10.3)
POTASSIUM SERPL-SCNC: 3.7 MMOL/L (ref 3.3–5.1)
POTASSIUM SERPL-SCNC: 3.7 MMOL/L (ref 3.3–5.1)
PROT SERPL-MCNC: 7.2 G/DL (ref 5.9–8.4)
RBC # BLD AUTO: 1.57 M/UL (ref 3.7–5.4)
SODIUM SERPL-SCNC: 139 MMOL/L (ref 136–144)
WBC # BLD AUTO: 16.1 K/UL (ref 4–11)

## 2018-07-22 PROCEDURE — 99223 1ST HOSP IP/OBS HIGH 75: CPT | Performed by: INTERNAL MEDICINE

## 2018-07-22 PROCEDURE — 99233 SBSQ HOSP IP/OBS HIGH 50: CPT | Performed by: HOSPITALIST

## 2018-07-22 PROCEDURE — 99232 SBSQ HOSP IP/OBS MODERATE 35: CPT | Performed by: INTERNAL MEDICINE

## 2018-07-22 RX ORDER — POTASSIUM CHLORIDE 20 MEQ/1
40 TABLET, EXTENDED RELEASE ORAL ONCE
Status: COMPLETED | OUTPATIENT
Start: 2018-07-22 | End: 2018-07-22

## 2018-07-22 RX ORDER — SODIUM CHLORIDE 9 MG/ML
INJECTION, SOLUTION INTRAVENOUS
Status: COMPLETED
Start: 2018-07-22 | End: 2018-07-22

## 2018-07-22 NOTE — PROGRESS NOTES
[facility]    Hematology/Oncology   Progress Note    July Arredondo Patient Status:  Inpatient    1990 MRN [id number]   Location [facility] 4W/SW/SE Attending Lee Larson MD   Hosp Day # 1 PCP None Pcp     Sanna Collier secondary to her acute sickle crisis. --With regard to her anemia, if her hemoglobin drops below 6, we recommend transfusing 1 unit of leuko-reduced packed red blood cells. We will try to limit transfusions due to iron overload.    --ordered iron chelati

## 2018-07-22 NOTE — CONSULTS
Gastroenterology consultation note    Reason for consultation:  History of hepatitis C in the setting of sickle cell disease. History of present illness:   The patient is a pleasant 54-year-old -American female with a diagnosis of sickle cell di Heparin Sodium (Porcine) 5000 UNIT/ML injection 5,000 Units 5,000 Units Subcutaneous 2 times per day   acetaminophen (TYLENOL) tab 650 mg 650 mg Oral Q6H PRN   acetaminophen (TYLENOL) tab 650 mg 650 mg Oral Q4H PRN   Or      HYDROcodone-acetaminophen (NO Nondistended. Bowel sounds are present. There is direct tenderness to palpation in the right upper quadrant and left upper quadrant. It is difficult to ascertain whether there is hepatic enlargement. The spleen is not palpable.     Extremities: Without consideration. Laboratory testing will be followed. Hepatitis C to be further evaluated and treated as outlined above. See below for iron overload.   I would obtain a liver ultrasound to evaluate for gallstones, cholecystitis and biliary ductal dilatatio

## 2018-07-22 NOTE — PROGRESS NOTES
[place] [facility] - [facility]    Progress Note    Vinicio Suzie Patient Status:  Inpatient    1990 MRN [id number]   Location [facility] 4W/SW/SE Attending Neeta Nettles MD   Hosp Day # 1 PCP None Pcp       Subjective:   Brittany Desouza Hb <6     Iron overload  - caution when transfusing  - transfuse if Hb <6      Hypoxia  Secondary to sickle cell crisis  Continue oxygen- monitor O2 sats       History of hepatitis C  Follow LFTs daily   GI on board- will await recs         Quality:  · DVT

## 2018-07-23 ENCOUNTER — APPOINTMENT (OUTPATIENT)
Dept: ULTRASOUND IMAGING | Facility: HOSPITAL | Age: 28
DRG: 812 | End: 2018-07-23
Attending: INTERNAL MEDICINE
Payer: MEDICAID

## 2018-07-23 LAB
ALBUMIN SERPL BCP-MCNC: 3.1 G/DL (ref 3.5–4.8)
ALBUMIN/GLOB SERPL: 0.8 {RATIO} (ref 1–2)
ALP SERPL-CCNC: 184 U/L (ref 32–100)
ALT SERPL-CCNC: 69 U/L (ref 14–54)
ANION GAP SERPL CALC-SCNC: 5 MMOL/L (ref 0–18)
AST SERPL-CCNC: 110 U/L (ref 15–41)
BASOPHILS # BLD: 0.1 K/UL (ref 0–0.2)
BASOPHILS # BLD: 0.1 K/UL (ref 0–0.2)
BASOPHILS NFR BLD: 1 %
BASOPHILS NFR BLD: 1 %
BILIRUB SERPL-MCNC: 4.9 MG/DL (ref 0.3–1.2)
BUN SERPL-MCNC: 7 MG/DL (ref 8–20)
BUN/CREAT SERPL: 10.8 (ref 10–20)
CALCIUM SERPL-MCNC: 8.6 MG/DL (ref 8.5–10.5)
CHLORIDE SERPL-SCNC: 113 MMOL/L (ref 95–110)
CO2 SERPL-SCNC: 21 MMOL/L (ref 22–32)
CREAT SERPL-MCNC: 0.65 MG/DL (ref 0.5–1.5)
EOSINOPHIL # BLD: 0 K/UL (ref 0–0.7)
EOSINOPHIL # BLD: 0.2 K/UL (ref 0–0.7)
EOSINOPHIL NFR BLD: 0 %
EOSINOPHIL NFR BLD: 1 %
ERYTHROCYTE [DISTWIDTH] IN BLOOD BY AUTOMATED COUNT: 20.5 % (ref 11–15)
ERYTHROCYTE [DISTWIDTH] IN BLOOD BY AUTOMATED COUNT: 21.6 % (ref 11–15)
GLOBULIN PLAS-MCNC: 3.8 G/DL (ref 2.5–3.7)
GLUCOSE SERPL-MCNC: 98 MG/DL (ref 70–99)
HCT VFR BLD AUTO: 15.2 % (ref 35–48)
HCT VFR BLD AUTO: 17.5 % (ref 35–48)
HCT VFR BLD AUTO: 20.1 % (ref 35–48)
HGB BLD-MCNC: 5.5 G/DL (ref 12–16)
HGB BLD-MCNC: 6.2 G/DL (ref 12–16)
HGB BLD-MCNC: 7.1 G/DL (ref 12–16)
LYMPHOCYTES # BLD: 4 K/UL (ref 1–4)
LYMPHOCYTES # BLD: 6.6 K/UL (ref 1–4)
LYMPHOCYTES NFR BLD: 30 %
LYMPHOCYTES NFR BLD: 37 %
MCH RBC QN AUTO: 37.9 PG (ref 27–32)
MCH RBC QN AUTO: 38.3 PG (ref 27–32)
MCHC RBC AUTO-ENTMCNC: 35.2 G/DL (ref 32–37)
MCHC RBC AUTO-ENTMCNC: 35.9 G/DL (ref 32–37)
MCV RBC AUTO: 106.7 FL (ref 80–100)
MCV RBC AUTO: 107.8 FL (ref 80–100)
MONOCYTES # BLD: 1.5 K/UL (ref 0–1)
MONOCYTES # BLD: 2.4 K/UL (ref 0–1)
MONOCYTES NFR BLD: 11 %
MONOCYTES NFR BLD: 14 %
NEUTROPHILS # BLD AUTO: 7.7 K/UL (ref 1.8–7.7)
NEUTROPHILS # BLD AUTO: 8.4 K/UL (ref 1.8–7.7)
NEUTROPHILS NFR BLD: 45 %
NEUTROPHILS NFR BLD: 47 %
NEUTS BAND NFR BLD: 13 %
OSMOLALITY UR CALC.SUM OF ELEC: 286 MOSM/KG (ref 275–295)
PLATELET # BLD AUTO: 223 K/UL (ref 140–400)
PLATELET # BLD AUTO: 261 K/UL (ref 140–400)
PMV BLD AUTO: 8.7 FL (ref 7.4–10.3)
PMV BLD AUTO: 8.9 FL (ref 7.4–10.3)
POTASSIUM SERPL-SCNC: 4.3 MMOL/L (ref 3.3–5.1)
POTASSIUM SERPL-SCNC: 4.3 MMOL/L (ref 3.3–5.1)
PROT SERPL-MCNC: 6.9 G/DL (ref 5.9–8.4)
RBC # BLD AUTO: 1.42 M/UL (ref 3.7–5.4)
RBC # BLD AUTO: 1.63 M/UL (ref 3.7–5.4)
RGTSCRN: 3
SODIUM SERPL-SCNC: 139 MMOL/L (ref 136–144)
WBC # BLD AUTO: 13.3 K/UL (ref 4–11)
WBC # BLD AUTO: 17.8 K/UL (ref 4–11)

## 2018-07-23 PROCEDURE — 99233 SBSQ HOSP IP/OBS HIGH 50: CPT | Performed by: HOSPITALIST

## 2018-07-23 PROCEDURE — 76705 ECHO EXAM OF ABDOMEN: CPT | Performed by: INTERNAL MEDICINE

## 2018-07-23 PROCEDURE — 30233N1 TRANSFUSION OF NONAUTOLOGOUS RED BLOOD CELLS INTO PERIPHERAL VEIN, PERCUTANEOUS APPROACH: ICD-10-PCS | Performed by: INTERNAL MEDICINE

## 2018-07-23 PROCEDURE — 99232 SBSQ HOSP IP/OBS MODERATE 35: CPT | Performed by: INTERNAL MEDICINE

## 2018-07-23 PROCEDURE — 99232 SBSQ HOSP IP/OBS MODERATE 35: CPT | Performed by: PHYSICIAN ASSISTANT

## 2018-07-23 RX ORDER — DIPHENHYDRAMINE HYDROCHLORIDE 50 MG/ML
25 INJECTION INTRAMUSCULAR; INTRAVENOUS EVERY 4 HOURS PRN
Status: DISCONTINUED | OUTPATIENT
Start: 2018-07-23 | End: 2018-07-30

## 2018-07-23 RX ORDER — SODIUM CHLORIDE 9 MG/ML
INJECTION, SOLUTION INTRAVENOUS
Status: COMPLETED
Start: 2018-07-23 | End: 2018-07-23

## 2018-07-23 RX ORDER — SODIUM CHLORIDE 0.9 % (FLUSH) 0.9 %
10 SYRINGE (ML) INJECTION AS NEEDED
Status: DISCONTINUED | OUTPATIENT
Start: 2018-07-23 | End: 2018-07-30

## 2018-07-23 RX ORDER — SODIUM CHLORIDE 9 MG/ML
INJECTION, SOLUTION INTRAVENOUS ONCE
Status: COMPLETED | OUTPATIENT
Start: 2018-07-23 | End: 2018-07-23

## 2018-07-23 RX ORDER — ENOXAPARIN SODIUM 100 MG/ML
40 INJECTION SUBCUTANEOUS DAILY
Status: DISCONTINUED | OUTPATIENT
Start: 2018-07-23 | End: 2018-07-30

## 2018-07-23 RX ORDER — POLYETHYLENE GLYCOL 3350 17 G/17G
17 POWDER, FOR SOLUTION ORAL DAILY
Status: DISCONTINUED | OUTPATIENT
Start: 2018-07-23 | End: 2018-07-30

## 2018-07-23 RX ORDER — DOCUSATE SODIUM 100 MG/1
100 CAPSULE, LIQUID FILLED ORAL 2 TIMES DAILY PRN
Status: DISCONTINUED | OUTPATIENT
Start: 2018-07-23 | End: 2018-07-30

## 2018-07-23 NOTE — PROGRESS NOTES
[place] [facility]    Progress Note    [facility]:  [facility]    1990 MRN [id number]   Location [facility] 4W/SW/SE Attending Shae Paez MD   [facility] Day # 2 PCP None Pcp       Subjective:   Toby Kass Zenon Spatz hepatocellular disease. No focal hepatic mass is detected by sonographic technique. 2. Reported right upper quadrant abdominal tenderness during sonographic interrogation. However, there is no cholelithiasis or sonographic evidence of acute cholecystitis.

## 2018-07-23 NOTE — PROGRESS NOTES
[facility]     Gastroenterology Progress Note    Geoff Arredondo Patient Status:  Inpatient    1990 MRN [id number]   Location [facility] 4W/SW/SE Attending Chele Johnson MD   Hosp Day # 2 PCP None Pcp       Subj light of acute painful episode. Chelation therapy should be resumed + managed by hematology. 4. Constipation: likely will be worsened by use of analgesics - recommend MiraLAX + stool softeners PRN.     Recommend:  - treatment of acute painful episode per Hua Gil MD on 7/23/2018 at 7:02     Approved by (CST): Matthew Smith MD on 7/23/2018 at 7:05

## 2018-07-24 LAB
ANION GAP SERPL CALC-SCNC: 5 MMOL/L (ref 0–18)
BASOPHILS # BLD: 0.2 K/UL (ref 0–0.2)
BASOPHILS NFR BLD: 1 %
BLOOD TYPE BARCODE: 5100
BUN SERPL-MCNC: 9 MG/DL (ref 8–20)
BUN/CREAT SERPL: 16.4 (ref 10–20)
CALCIUM SERPL-MCNC: 8.5 MG/DL (ref 8.5–10.5)
CHLORIDE SERPL-SCNC: 112 MMOL/L (ref 95–110)
CO2 SERPL-SCNC: 21 MMOL/L (ref 22–32)
CREAT SERPL-MCNC: 0.55 MG/DL (ref 0.5–1.5)
EOSINOPHIL # BLD: 0.3 K/UL (ref 0–0.7)
EOSINOPHIL NFR BLD: 2 %
ERYTHROCYTE [DISTWIDTH] IN BLOOD BY AUTOMATED COUNT: 25.8 % (ref 11–15)
GLUCOSE SERPL-MCNC: 89 MG/DL (ref 70–99)
HCT VFR BLD AUTO: 18.6 % (ref 35–48)
HGB BLD-MCNC: 6.7 G/DL (ref 12–16)
LYMPHOCYTES # BLD: 5.5 K/UL (ref 1–4)
LYMPHOCYTES NFR BLD: 35 %
MCH RBC QN AUTO: 37.3 PG (ref 27–32)
MCHC RBC AUTO-ENTMCNC: 36.1 G/DL (ref 32–37)
MCV RBC AUTO: 103.2 FL (ref 80–100)
MONOCYTES # BLD: 0.8 K/UL (ref 0–1)
MONOCYTES NFR BLD: 5 %
NEUTROPHILS # BLD AUTO: 9 K/UL (ref 1.8–7.7)
NEUTROPHILS NFR BLD: 55 %
NEUTS BAND NFR BLD: 2 %
NRBC BLD-RTO: 14 % (ref ?–1)
OSMOLALITY UR CALC.SUM OF ELEC: 284 MOSM/KG (ref 275–295)
PLATELET # BLD AUTO: 248 K/UL (ref 140–400)
PMV BLD AUTO: 8 FL (ref 7.4–10.3)
POTASSIUM SERPL-SCNC: 4 MMOL/L (ref 3.3–5.1)
RBC # BLD AUTO: 1.8 M/UL (ref 3.7–5.4)
SODIUM SERPL-SCNC: 138 MMOL/L (ref 136–144)
WBC # BLD AUTO: 15.8 K/UL (ref 4–11)

## 2018-07-24 PROCEDURE — 99232 SBSQ HOSP IP/OBS MODERATE 35: CPT | Performed by: INTERNAL MEDICINE

## 2018-07-24 PROCEDURE — 99232 SBSQ HOSP IP/OBS MODERATE 35: CPT | Performed by: HOSPITALIST

## 2018-07-24 PROCEDURE — 99232 SBSQ HOSP IP/OBS MODERATE 35: CPT | Performed by: PHYSICIAN ASSISTANT

## 2018-07-24 RX ORDER — 0.9 % SODIUM CHLORIDE 0.9 %
VIAL (ML) INJECTION
Status: COMPLETED
Start: 2018-07-24 | End: 2018-07-24

## 2018-07-24 NOTE — PROGRESS NOTES
[place] [facility] - [facility]    Hematology/Oncology   Progress Note    [place] Arredondo Patient Status:  Inpatient    1990 MRN [id number]   Location [facility] 4W/SW/SE Attending Sunita Roldan MD   [facility] Day # 3 PCP None Pcp     Radha Bales overload secondary to previous transfusions and hemoglobin SS, history of pulmonary embolism, hepatitis C admitted to the hospital with acute sickle cell crisis.     --clinically better  --Her indirect hyperbilirubinemia is secondary to her acute sickle cri

## 2018-07-24 NOTE — PROGRESS NOTES
[place] [facility] - [facility]    Progress Note    Vinicio Ferrodebby Patient Status:  Inpatient    1990 MRN [id number]   Location [facility] 4W/SW/SE Attending Faheem Stuart MD   [facility] Day # 3 PCP None Pcp       Subjective:   Vadim Davila sonographic evidence of acute cholecystitis. 3. Negative for hepatobiliary dilatation. 4. Lesser incidental findings as above.      Dictated by (CST): Nabeel Srinivasan MD on 7/23/2018 at 7:02     Approved by (CST): Nabeel Srinivasan MD on 7/23/2018 at 7:05

## 2018-07-24 NOTE — PROGRESS NOTES
[place] [facility] - [facility]    Hematology/Oncology   Progress Note    Shivamtiffany Arredondo Patient Status:  Inpatient    1990 MRN [id number]   Location [facility] 4W/SW/SE Attending Sunita Roldan MD   [facility] Day # 2 PCP None Pcp     Radha Bales cholelithiasis or sonographic evidence of acute cholecystitis. 3. Negative for hepatobiliary dilatation. 4. Lesser incidental findings as above.      Dictated by (CST): Fariba Salcido MD on 7/23/2018 at 7:02     Approved by (CST): Fariba Salcido MD on 7

## 2018-07-24 NOTE — PLAN OF CARE
PAIN - ADULT    • Verbalizes/displays adequate comfort level or patient's stated pain goal Not Progressing          MUSCULOSKELETAL - ADULT    • Return mobility to safest level of function Progressing        Patient Centered Care    • Patient preferences a

## 2018-07-25 LAB
ALBUMIN SERPL BCP-MCNC: 3.1 G/DL (ref 3.5–4.8)
ALBUMIN/GLOB SERPL: 0.8 {RATIO} (ref 1–2)
ALP SERPL-CCNC: 194 U/L (ref 32–100)
ALT SERPL-CCNC: 66 U/L (ref 14–54)
ANION GAP SERPL CALC-SCNC: 4 MMOL/L (ref 0–18)
AST SERPL-CCNC: 113 U/L (ref 15–41)
BASOPHILS # BLD: 0.2 K/UL (ref 0–0.2)
BASOPHILS NFR BLD: 1 %
BILIRUB SERPL-MCNC: 4.3 MG/DL (ref 0.3–1.2)
BUN SERPL-MCNC: 8 MG/DL (ref 8–20)
BUN/CREAT SERPL: 12.1 (ref 10–20)
CALCIUM SERPL-MCNC: 8.6 MG/DL (ref 8.5–10.5)
CHLORIDE SERPL-SCNC: 111 MMOL/L (ref 95–110)
CO2 SERPL-SCNC: 23 MMOL/L (ref 22–32)
CREAT SERPL-MCNC: 0.66 MG/DL (ref 0.5–1.5)
EOSINOPHIL # BLD: 0.3 K/UL (ref 0–0.7)
EOSINOPHIL NFR BLD: 2 %
ERYTHROCYTE [DISTWIDTH] IN BLOOD BY AUTOMATED COUNT: 27.7 % (ref 11–15)
GLOBULIN PLAS-MCNC: 4.1 G/DL (ref 2.5–3.7)
GLUCOSE SERPL-MCNC: 91 MG/DL (ref 70–99)
HCT VFR BLD AUTO: 20 % (ref 35–48)
HGB BLD-MCNC: 7 G/DL (ref 12–16)
LYMPHOCYTES # BLD: 5.8 K/UL (ref 1–4)
LYMPHOCYTES NFR BLD: 37 %
MCH RBC QN AUTO: 36.3 PG (ref 27–32)
MCHC RBC AUTO-ENTMCNC: 35.2 G/DL (ref 32–37)
MCV RBC AUTO: 103.1 FL (ref 80–100)
MONOCYTES # BLD: 2.1 K/UL (ref 0–1)
MONOCYTES NFR BLD: 13 %
NEUTROPHILS # BLD AUTO: 7.2 K/UL (ref 1.8–7.7)
NEUTROPHILS NFR BLD: 46 %
OSMOLALITY UR CALC.SUM OF ELEC: 284 MOSM/KG (ref 275–295)
PLATELET # BLD AUTO: 245 K/UL (ref 140–400)
PMV BLD AUTO: 8.2 FL (ref 7.4–10.3)
POTASSIUM SERPL-SCNC: 4 MMOL/L (ref 3.3–5.1)
PROT SERPL-MCNC: 7.2 G/DL (ref 5.9–8.4)
RBC # BLD AUTO: 1.94 M/UL (ref 3.7–5.4)
SODIUM SERPL-SCNC: 138 MMOL/L (ref 136–144)
WBC # BLD AUTO: 15.5 K/UL (ref 4–11)

## 2018-07-25 PROCEDURE — 99232 SBSQ HOSP IP/OBS MODERATE 35: CPT | Performed by: INTERNAL MEDICINE

## 2018-07-25 PROCEDURE — 99232 SBSQ HOSP IP/OBS MODERATE 35: CPT | Performed by: PHYSICIAN ASSISTANT

## 2018-07-25 PROCEDURE — 99232 SBSQ HOSP IP/OBS MODERATE 35: CPT | Performed by: HOSPITALIST

## 2018-07-25 NOTE — PROGRESS NOTES
[place] [facility] - [facility]    Hematology/Oncology   Progress Note    Tammy Borden Arnulfo Patient Status:  Inpatient    1990 MRN [id number]   Location [facility] 4W/SW/SE Attending Darshan Wu MD   [address] [facility] [address] Day # 4 PCP None Pcp     Arun Hassan 7:05              Medications reviewed.     Assessment and Plan:  56-year-old female with a history of hemoglobin SS disease, iron overload secondary to previous transfusions and hemoglobin SS, history of pulmonary embolism, hepatitis C admitted to the hosp

## 2018-07-25 NOTE — PROGRESS NOTES
[place] [facility] - [facility]    Progress Note    Vinicio Bowsercarmine Patient Status:  Inpatient    1990 MRN [id number]   Location [facility] 4W/SW/SE Attending Radhames Bryant MD   [facility] Day # 4 PCP None Pcp       Subjective:   [facility] dilatation. 4. Lesser incidental findings as above.      Dictated by (CST): Matthew Smith MD on 7/23/2018 at 7:02     Approved by (CST): Matthew Smith MD on 7/23/2018 at 7:05               • enoxaparin  40 mg Subcutaneous Daily   • PEG 3350  17 g Oral

## 2018-07-25 NOTE — PROGRESS NOTES
[facility]     Gastroenterology Progress Note    Lyle Arredondo Patient Status:  Inpatient    1990 MRN [id number]   Location [facility] 4W/SW/SE Attending Chip Landa MD   Hosp Day # 4 PCP None Pcp       Subj Patient denies known family history of liver disease. Bilirubin with reassuring down-trend. 2. Elevated LFTs/Jaundice: hyperbilirubinemia of primarily indirect origin c/w hemolytic state in this patient with sickle cell disease.  A portion of the abnorm

## 2018-07-26 LAB
BASOPHILS # BLD: 0.1 K/UL (ref 0–0.2)
BASOPHILS NFR BLD: 1 %
EOSINOPHIL # BLD: 0.3 K/UL (ref 0–0.7)
EOSINOPHIL NFR BLD: 2 %
ERYTHROCYTE [DISTWIDTH] IN BLOOD BY AUTOMATED COUNT: 24.1 % (ref 11–15)
HCT VFR BLD AUTO: 17.3 % (ref 35–48)
HGB BLD-MCNC: 6.1 G/DL (ref 12–16)
LYMPHOCYTES # BLD: 4.3 K/UL (ref 1–4)
LYMPHOCYTES NFR BLD: 32 %
MCH RBC QN AUTO: 35.7 PG (ref 27–32)
MCHC RBC AUTO-ENTMCNC: 35.4 G/DL (ref 32–37)
MCV RBC AUTO: 100.9 FL (ref 80–100)
MONOCYTES # BLD: 1.6 K/UL (ref 0–1)
MONOCYTES NFR BLD: 12 %
NEUTROPHILS # BLD AUTO: 6.9 K/UL (ref 1.8–7.7)
NEUTROPHILS NFR BLD: 50 %
NEUTS BAND NFR BLD: 2 %
NRBC BLD-RTO: 5 % (ref ?–1)
PLATELET # BLD AUTO: 185 K/UL (ref 140–400)
PMV BLD AUTO: 8.9 FL (ref 7.4–10.3)
RBC # BLD AUTO: 1.71 M/UL (ref 3.7–5.4)
VARIANT LYMPHS NFR BLD MANUAL: 1 %
WBC # BLD AUTO: 13.1 K/UL (ref 4–11)

## 2018-07-26 PROCEDURE — 99232 SBSQ HOSP IP/OBS MODERATE 35: CPT | Performed by: HOSPITALIST

## 2018-07-26 PROCEDURE — 99232 SBSQ HOSP IP/OBS MODERATE 35: CPT | Performed by: INTERNAL MEDICINE

## 2018-07-26 RX ORDER — FOLIC ACID 1 MG/1
5 TABLET ORAL DAILY
Status: DISCONTINUED | OUTPATIENT
Start: 2018-07-26 | End: 2018-07-30

## 2018-07-26 RX ORDER — 0.9 % SODIUM CHLORIDE 0.9 %
VIAL (ML) INJECTION
Status: COMPLETED
Start: 2018-07-26 | End: 2018-07-26

## 2018-07-26 NOTE — PROGRESS NOTES
[facility]    Progress Note    Vinicio Bowsercarmine Patient Status:  Inpatient    1990 MRN [id number]   Location [facility] 4W/SW/SE Attending Cuong Ness MD   Hosp Day # 5 PCP None Pcp     Subjective:   Subjective: K 4.0 07/25/2018    (H) 07/25/2018   CO2 23 07/25/2018   GLU 91 07/25/2018   CA 8.6 07/25/2018   ALB 3.1 (L) 07/25/2018   ALKPHO 194 (H) 07/25/2018   BILT 4.3 (H) 07/25/2018   TP 7.2 07/25/2018    (H) 07/25/2018   ALT 66 (H) 07/25/2018   LIP

## 2018-07-26 NOTE — PROGRESS NOTES
[place] [facility] - [facility]    Progress Note    Vinicio Ferrodebby Patient Status:  Inpatient    1990 MRN [id number]   Location [facility] 4W/SW/SE Attending Lexy Arrieta MD   [facility] Day # 5 PCP None Pcp       Subjective:   [facility] hepatitis C  Cont to monitor LFTs  GI on board  Abdominal ultrasound reviewed  Will need OP f/u with GI     DVT PPx: Lovenox  Full Code    Doug Adorno, 07/26/18

## 2018-07-27 LAB
BASOPHILS # BLD: 0.1 K/UL (ref 0–0.2)
BASOPHILS NFR BLD: 1 %
EOSINOPHIL # BLD: 0.2 K/UL (ref 0–0.7)
EOSINOPHIL NFR BLD: 1 %
ERYTHROCYTE [DISTWIDTH] IN BLOOD BY AUTOMATED COUNT: 24.6 % (ref 11–15)
HCT VFR BLD AUTO: 17.4 % (ref 35–48)
HGB BLD-MCNC: 6.2 G/DL (ref 12–16)
LYMPHOCYTES # BLD: 4.6 K/UL (ref 1–4)
LYMPHOCYTES NFR BLD: 33 %
MCH RBC QN AUTO: 35.6 PG (ref 27–32)
MCHC RBC AUTO-ENTMCNC: 35.8 G/DL (ref 32–37)
MCV RBC AUTO: 99.4 FL (ref 80–100)
MONOCYTES # BLD: 2.4 K/UL (ref 0–1)
MONOCYTES NFR BLD: 17 %
NEUTROPHILS # BLD AUTO: 6.6 K/UL (ref 1.8–7.7)
NEUTROPHILS NFR BLD: 48 %
PLATELET # BLD AUTO: 218 K/UL (ref 140–400)
PMV BLD AUTO: 9 FL (ref 7.4–10.3)
RBC # BLD AUTO: 1.75 M/UL (ref 3.7–5.4)
WBC # BLD AUTO: 14 K/UL (ref 4–11)

## 2018-07-27 PROCEDURE — 99232 SBSQ HOSP IP/OBS MODERATE 35: CPT | Performed by: HOSPITALIST

## 2018-07-27 NOTE — PROGRESS NOTES
[place] [facility] - [facility]    Progress Note    Vinicio Bowsercarmine Patient Status:  Inpatient    1990 MRN [id number]   Location [facility] 4W/SW/SE Attending Lexy Arrieta MD   [facility] Day # 6 PCP None Pcp       Subjective:   [facility] Hypoxia  RESOLVED  Secondary to sickle cell crisis      History of hepatitis C  Cont to monitor LFTs  GI on board  Abdominal ultrasound reviewed  Will need OP f/u with GI     DVT PPx: Lovenox  Full Code    Marina De Jesus, 07/27/18

## 2018-07-27 NOTE — PLAN OF CARE
MUSCULOSKELETAL - ADULT    • Return mobility to safest level of function Progressing    C/O leg and back pain w sickle cell crisis      PAIN - ADULT    • Verbalizes/displays adequate comfort level or patient's stated pain goal Progressing    Pain managed w

## 2018-07-28 LAB
BASOPHILS # BLD: 0.1 K/UL (ref 0–0.2)
BASOPHILS NFR BLD: 1 %
EOSINOPHIL # BLD: 0.1 K/UL (ref 0–0.7)
EOSINOPHIL NFR BLD: 1 %
ERYTHROCYTE [DISTWIDTH] IN BLOOD BY AUTOMATED COUNT: 24.3 % (ref 11–15)
HCT VFR BLD AUTO: 17.7 % (ref 35–48)
HGB BLD-MCNC: 6.6 G/DL (ref 12–16)
LYMPHOCYTES # BLD: 5.5 K/UL (ref 1–4)
LYMPHOCYTES NFR BLD: 37 %
MCH RBC QN AUTO: 36.1 PG (ref 27–32)
MCHC RBC AUTO-ENTMCNC: 37.1 G/DL (ref 32–37)
MCV RBC AUTO: 97.4 FL (ref 80–100)
MONOCYTES # BLD: 2.4 K/UL (ref 0–1)
MONOCYTES NFR BLD: 16 %
NEUTROPHILS # BLD AUTO: 6.6 K/UL (ref 1.8–7.7)
NEUTROPHILS NFR BLD: 45 %
PLATELET # BLD AUTO: 219 K/UL (ref 140–400)
PMV BLD AUTO: 8.8 FL (ref 7.4–10.3)
RBC # BLD AUTO: 1.82 M/UL (ref 3.7–5.4)
WBC # BLD AUTO: 14.8 K/UL (ref 4–11)

## 2018-07-28 PROCEDURE — 99232 SBSQ HOSP IP/OBS MODERATE 35: CPT | Performed by: HOSPITALIST

## 2018-07-28 RX ORDER — HYDROMORPHONE HYDROCHLORIDE 2 MG/1
2 TABLET ORAL EVERY 4 HOURS PRN
Status: DISCONTINUED | OUTPATIENT
Start: 2018-07-28 | End: 2018-07-30

## 2018-07-28 RX ORDER — OXYCODONE AND ACETAMINOPHEN 10; 325 MG/1; MG/1
1 TABLET ORAL EVERY 4 HOURS PRN
Status: DISCONTINUED | OUTPATIENT
Start: 2018-07-28 | End: 2018-07-30

## 2018-07-28 NOTE — PROGRESS NOTES
[place] [facility] - [facility]    Progress Note    Vinicio Bowsercarmine Patient Status:  Inpatient    1990 MRN [id number]   Location [facility] 4W/SW/SE Attending Pj Tang MD   [facility] Day # 7 PCP None Pcp       Subjective:   [facility] Hypoxia  RESOLVED  Secondary to sickle cell crisis      History of hepatitis C  Cont to monitor LFTs  GI on board  Abdominal ultrasound reviewed  Will need OP f/u with GI     DVT PPx: Lovenox  Full Code    Dispo: Home tomorrow if pain managed with PO meds

## 2018-07-29 LAB
BASOPHILS # BLD: 0 K/UL (ref 0–0.2)
BASOPHILS NFR BLD: 0 %
EOSINOPHIL # BLD: 0.2 K/UL (ref 0–0.7)
EOSINOPHIL NFR BLD: 1 %
ERYTHROCYTE [DISTWIDTH] IN BLOOD BY AUTOMATED COUNT: 24.6 % (ref 11–15)
HCT VFR BLD AUTO: 17.9 % (ref 35–48)
HGB BLD-MCNC: 6.3 G/DL (ref 12–16)
LYMPHOCYTES # BLD: 7.7 K/UL (ref 1–4)
LYMPHOCYTES NFR BLD: 52 %
MCH RBC QN AUTO: 36.4 PG (ref 27–32)
MCHC RBC AUTO-ENTMCNC: 35.2 G/DL (ref 32–37)
MCV RBC AUTO: 103.3 FL (ref 80–100)
MONOCYTES # BLD: 1 K/UL (ref 0–1)
MONOCYTES NFR BLD: 7 %
NEUTROPHILS # BLD AUTO: 5.9 K/UL (ref 1.8–7.7)
NEUTROPHILS NFR BLD: 35 %
NEUTS BAND NFR BLD: 5 %
NRBC BLD-RTO: 18 % (ref ?–1)
PLATELET # BLD AUTO: 177 K/UL (ref 140–400)
PMV BLD AUTO: 8.5 FL (ref 7.4–10.3)
RBC # BLD AUTO: 1.74 M/UL (ref 3.7–5.4)
WBC # BLD AUTO: 14.8 K/UL (ref 4–11)

## 2018-07-29 PROCEDURE — 99232 SBSQ HOSP IP/OBS MODERATE 35: CPT | Performed by: HOSPITALIST

## 2018-07-29 NOTE — PROGRESS NOTES
[place] [facility] - [facility]    Progress Note    Vladimir Busing Arnulfo Patient Status:  Inpatient    1990 MRN [id number]   Location [facility] 4W/SW/SE Attending Juany Chavez MD   [address] Day # 8 PCP None Pcp       Subjective:   Vladimir Busing Hypoxia  RESOLVED  Secondary to sickle cell crisis      History of hepatitis C  Cont to monitor LFTs  GI on board  Abdominal ultrasound reviewed  Will need OP f/u with GI     DVT PPx: Lovenox  Full Code    Dispo: Home tomorrow if pain managed with PO meds

## 2018-07-29 NOTE — PLAN OF CARE
HEMATOLOGIC - ADULT    • Maintains hematologic stability Progressing        HGB daily. Plan to transfuse if HGB <6.  Dr. Brendan Ray notified of  Critical HGB today. Continue IV fluids at 100ml an hour.    MUSCULOSKELETAL - ADULT    • Return mobility to safe

## 2018-07-30 VITALS
TEMPERATURE: 97 F | BODY MASS INDEX: 20.64 KG/M2 | HEART RATE: 77 BPM | WEIGHT: 139.38 LBS | HEIGHT: 69 IN | OXYGEN SATURATION: 96 % | DIASTOLIC BLOOD PRESSURE: 77 MMHG | SYSTOLIC BLOOD PRESSURE: 127 MMHG | RESPIRATION RATE: 18 BRPM

## 2018-07-30 LAB
BASOPHILS # BLD: 0.1 K/UL (ref 0–0.2)
BASOPHILS NFR BLD: 1 %
EOSINOPHIL # BLD: 0.2 K/UL (ref 0–0.7)
EOSINOPHIL NFR BLD: 2 %
ERYTHROCYTE [DISTWIDTH] IN BLOOD BY AUTOMATED COUNT: 27 % (ref 11–15)
HCT VFR BLD AUTO: 19.5 % (ref 35–48)
HGB BLD-MCNC: 6.7 G/DL (ref 12–16)
LYMPHOCYTES # BLD: 4.7 K/UL (ref 1–4)
LYMPHOCYTES NFR BLD: 35 %
MCH RBC QN AUTO: 36.2 PG (ref 27–32)
MCHC RBC AUTO-ENTMCNC: 34.5 G/DL (ref 32–37)
MCV RBC AUTO: 104.8 FL (ref 80–100)
MONOCYTES # BLD: 1.8 K/UL (ref 0–1)
MONOCYTES NFR BLD: 13 %
NEUTROPHILS # BLD AUTO: 6.7 K/UL (ref 1.8–7.7)
NEUTROPHILS NFR BLD: 49 %
PLATELET # BLD AUTO: 175 K/UL (ref 140–400)
PMV BLD AUTO: 9 FL (ref 7.4–10.3)
RBC # BLD AUTO: 1.86 M/UL (ref 3.7–5.4)
WBC # BLD AUTO: 13.6 K/UL (ref 4–11)

## 2018-07-30 PROCEDURE — 99239 HOSP IP/OBS DSCHRG MGMT >30: CPT | Performed by: HOSPITALIST

## 2018-07-30 RX ORDER — OXYCODONE AND ACETAMINOPHEN 10; 325 MG/1; MG/1
1 TABLET ORAL EVERY 4 HOURS PRN
Qty: 30 TABLET | Refills: 0 | Status: ON HOLD | OUTPATIENT
Start: 2018-07-30 | End: 2019-01-24

## 2018-07-30 RX ORDER — FOLIC ACID 1 MG/1
5 TABLET ORAL DAILY
Qty: 150 TABLET | Refills: 0 | Status: ON HOLD | OUTPATIENT
Start: 2018-07-31 | End: 2019-01-24

## 2018-07-30 RX ORDER — HYDROMORPHONE HYDROCHLORIDE 2 MG/1
TABLET ORAL
Qty: 30 TABLET | Refills: 0 | Status: ON HOLD | OUTPATIENT
Start: 2018-07-30 | End: 2019-01-24

## 2018-07-30 NOTE — DISCHARGE SUMMARY
[facility]    Discharge Summary    [facility] Patient Status:  Inpatient    1990 MRN [id number]   Location [facility] 4W/SW/SE Attending Stella Barth MD   Hosp Day # 9 PCP None Pcp     Date of Admission:  upper and lower extremity pain, back pain, rib cage pain. It started around 4 days ago, she said. Upon arrival her pulse oximetry was 93%, temperature 99.1. CBC showed white blood cell count of 13.3, hemoglobin of 6.2, MCV of 107.8.   Patient had bicarbo your prescriptions at the location directed by your doctor or nurse    Bring a paper prescription for each of these medications  · folic acid 1 MG Tabs  · HYDROmorphone HCl 2 MG Tabs  · oxyCODONE-acetaminophen  MG Tabs       Follow-up With  Details

## 2018-09-12 ENCOUNTER — LAB ENCOUNTER (OUTPATIENT)
Dept: LAB | Facility: HOSPITAL | Age: 28
End: 2018-09-12
Attending: INTERNAL MEDICINE
Payer: MEDICAID

## 2018-09-12 DIAGNOSIS — D57.00 SICKLE CELL PAIN CRISIS (HCC): ICD-10-CM

## 2018-09-12 DIAGNOSIS — D57.1 HB-SS DISEASE WITHOUT CRISIS (HCC): ICD-10-CM

## 2018-09-12 LAB
BASOPHILS # BLD: 0.2 K/UL (ref 0–0.2)
BASOPHILS NFR BLD: 1 %
EOSINOPHIL # BLD: 0 K/UL (ref 0–0.7)
EOSINOPHIL NFR BLD: 0 %
ERYTHROCYTE [DISTWIDTH] IN BLOOD BY AUTOMATED COUNT: 26.6 % (ref 11–15)
HCT VFR BLD AUTO: 21.1 % (ref 35–48)
HGB BLD-MCNC: 7.4 G/DL (ref 12–16)
LYMPHOCYTES # BLD: 6.6 K/UL (ref 1–4)
LYMPHOCYTES NFR BLD: 44 %
MCH RBC QN AUTO: 37.1 PG (ref 27–32)
MCHC RBC AUTO-ENTMCNC: 35.2 G/DL (ref 32–37)
MCV RBC AUTO: 105.6 FL (ref 80–100)
MONOCYTES # BLD: 0.9 K/UL (ref 0–1)
MONOCYTES NFR BLD: 6 %
NEUTROPHILS # BLD AUTO: 7.3 K/UL (ref 1.8–7.7)
NEUTROPHILS NFR BLD: 49 %
NRBC BLD-RTO: 6 % (ref ?–1)
PLATELET # BLD AUTO: 272 K/UL (ref 140–400)
PMV BLD AUTO: 8.4 FL (ref 7.4–10.3)
RBC # BLD AUTO: 2 M/UL (ref 3.7–5.4)
WBC # BLD AUTO: 14.9 K/UL (ref 4–11)

## 2018-09-12 PROCEDURE — 85007 BL SMEAR W/DIFF WBC COUNT: CPT

## 2018-09-12 PROCEDURE — 36415 COLL VENOUS BLD VENIPUNCTURE: CPT

## 2018-09-12 PROCEDURE — 85025 COMPLETE CBC W/AUTO DIFF WBC: CPT

## 2018-09-12 PROCEDURE — 85027 COMPLETE CBC AUTOMATED: CPT

## 2019-01-12 ENCOUNTER — APPOINTMENT (OUTPATIENT)
Dept: GENERAL RADIOLOGY | Facility: HOSPITAL | Age: 29
DRG: 811 | End: 2019-01-12
Attending: EMERGENCY MEDICINE
Payer: COMMERCIAL

## 2019-01-12 ENCOUNTER — HOSPITAL ENCOUNTER (INPATIENT)
Facility: HOSPITAL | Age: 29
LOS: 12 days | Discharge: HOME | DRG: 811 | End: 2019-01-25
Attending: EMERGENCY MEDICINE | Admitting: INTERNAL MEDICINE
Payer: COMMERCIAL

## 2019-01-12 DIAGNOSIS — D57.00 SICKLE CELL PAIN CRISIS (HCC): ICD-10-CM

## 2019-01-12 DIAGNOSIS — D57.00 SICKLE CELL CRISIS (HCC): Primary | ICD-10-CM

## 2019-01-12 DIAGNOSIS — R91.8 OPACITY OF LUNG ON IMAGING STUDY: ICD-10-CM

## 2019-01-12 DIAGNOSIS — R09.02 HYPOXIA: ICD-10-CM

## 2019-01-12 LAB
ALBUMIN SERPL BCP-MCNC: 3.4 G/DL (ref 3.5–4.8)
ALBUMIN/GLOB SERPL: 0.7 {RATIO} (ref 1–2)
ALP SERPL-CCNC: 268 U/L (ref 32–100)
ALT SERPL-CCNC: 68 U/L (ref 14–54)
ANION GAP SERPL CALC-SCNC: 9 MMOL/L (ref 0–18)
AST SERPL-CCNC: 118 U/L (ref 15–41)
B-HCG UR QL: NEGATIVE
BASOPHILS # BLD: 0.2 K/UL (ref 0–0.2)
BASOPHILS NFR BLD: 1 %
BILIRUB SERPL-MCNC: 6.6 MG/DL (ref 0.3–1.2)
BILIRUB UR QL: NEGATIVE
BUN SERPL-MCNC: 8 MG/DL (ref 8–20)
BUN/CREAT SERPL: 10.4 (ref 10–20)
CALCIUM SERPL-MCNC: 8.8 MG/DL (ref 8.5–10.5)
CHLORIDE SERPL-SCNC: 109 MMOL/L (ref 95–110)
CLARITY UR: CLEAR
CO2 SERPL-SCNC: 21 MMOL/L (ref 22–32)
CREAT SERPL-MCNC: 0.77 MG/DL (ref 0.5–1.5)
EOSINOPHIL # BLD: 0 K/UL (ref 0–0.7)
EOSINOPHIL NFR BLD: 0 %
ERYTHROCYTE [DISTWIDTH] IN BLOOD BY AUTOMATED COUNT: 20.9 % (ref 11–15)
FLUAV + FLUBV RNA SPEC NAA+PROBE: NEGATIVE
GLOBULIN PLAS-MCNC: 4.7 G/DL (ref 2.5–3.7)
GLUCOSE SERPL-MCNC: 105 MG/DL (ref 70–99)
GLUCOSE UR-MCNC: NEGATIVE MG/DL
HCT VFR BLD AUTO: 22.7 % (ref 35–48)
HGB BLD-MCNC: 8.1 G/DL (ref 12–16)
KETONES UR-MCNC: NEGATIVE MG/DL
LEUKOCYTE ESTERASE UR QL STRIP.AUTO: NEGATIVE
LYMPHOCYTES # BLD: 2.9 K/UL (ref 1–4)
LYMPHOCYTES NFR BLD: 19 %
MCH RBC QN AUTO: 38 PG (ref 27–32)
MCHC RBC AUTO-ENTMCNC: 35.5 G/DL (ref 32–37)
MCV RBC AUTO: 107 FL (ref 80–100)
MONOCYTES # BLD: 1.5 K/UL (ref 0–1)
MONOCYTES NFR BLD: 10 %
NEUTROPHILS # BLD AUTO: 10.7 K/UL (ref 1.8–7.7)
NEUTROPHILS NFR BLD: 64 %
NEUTS BAND NFR BLD: 6 %
NITRITE UR QL STRIP.AUTO: NEGATIVE
NRBC BLD-RTO: 1 % (ref ?–1)
OSMOLALITY UR CALC.SUM OF ELEC: 287 MOSM/KG (ref 275–295)
PH UR: 6 [PH] (ref 5–8)
PLATELET # BLD AUTO: 239 K/UL (ref 140–400)
PMV BLD AUTO: 8.8 FL (ref 7.4–10.3)
POTASSIUM SERPL-SCNC: 4 MMOL/L (ref 3.3–5.1)
PROCALCITONIN SERPL-MCNC: 0.31 NG/ML (ref ?–0.11)
PROT SERPL-MCNC: 8.1 G/DL (ref 5.9–8.4)
PROT UR-MCNC: 30 MG/DL
RBC # BLD AUTO: 2.12 M/UL (ref 3.7–5.4)
RBC #/AREA URNS AUTO: 6 /HPF
RETICS/RBC NFR AUTO: 21.4 % (ref 0.5–1.5)
SODIUM SERPL-SCNC: 139 MMOL/L (ref 136–144)
SP GR UR STRIP: 1.01 (ref 1–1.03)
UROBILINOGEN UR STRIP-ACNC: 4
VIT C UR-MCNC: NEGATIVE MG/DL
WBC # BLD AUTO: 15.3 K/UL (ref 4–11)
WBC #/AREA URNS AUTO: 5 /HPF

## 2019-01-12 PROCEDURE — 71045 X-RAY EXAM CHEST 1 VIEW: CPT | Performed by: EMERGENCY MEDICINE

## 2019-01-12 RX ORDER — DIPHENHYDRAMINE HCL 25 MG
25 CAPSULE ORAL ONCE
Status: COMPLETED | OUTPATIENT
Start: 2019-01-12 | End: 2019-01-12

## 2019-01-12 RX ORDER — HYDROMORPHONE HYDROCHLORIDE 1 MG/ML
1 INJECTION, SOLUTION INTRAMUSCULAR; INTRAVENOUS; SUBCUTANEOUS ONCE
Status: DISCONTINUED | OUTPATIENT
Start: 2019-01-12 | End: 2019-01-12

## 2019-01-12 RX ORDER — HYDROMORPHONE HYDROCHLORIDE 1 MG/ML
1 INJECTION, SOLUTION INTRAMUSCULAR; INTRAVENOUS; SUBCUTANEOUS ONCE
Status: COMPLETED | OUTPATIENT
Start: 2019-01-12 | End: 2019-01-12

## 2019-01-12 RX ORDER — LEVOFLOXACIN 5 MG/ML
750 INJECTION, SOLUTION INTRAVENOUS ONCE
Status: COMPLETED | OUTPATIENT
Start: 2019-01-12 | End: 2019-01-13

## 2019-01-12 RX ORDER — HYDROMORPHONE HYDROCHLORIDE 1 MG/ML
1 INJECTION, SOLUTION INTRAMUSCULAR; INTRAVENOUS; SUBCUTANEOUS
Status: DISCONTINUED | OUTPATIENT
Start: 2019-01-12 | End: 2019-01-12

## 2019-01-12 RX ORDER — HYDROMORPHONE HYDROCHLORIDE 1 MG/ML
2 INJECTION, SOLUTION INTRAMUSCULAR; INTRAVENOUS; SUBCUTANEOUS ONCE
Status: COMPLETED | OUTPATIENT
Start: 2019-01-12 | End: 2019-01-12

## 2019-01-13 PROBLEM — R91.8 OPACITY OF LUNG ON IMAGING STUDY: Status: ACTIVE | Noted: 2019-01-13

## 2019-01-13 LAB
ALBUMIN SERPL BCP-MCNC: 2.9 G/DL (ref 3.5–4.8)
ALBUMIN/GLOB SERPL: 0.7 {RATIO} (ref 1–2)
ALP SERPL-CCNC: 218 U/L (ref 32–100)
ALT SERPL-CCNC: 56 U/L (ref 14–54)
ANION GAP SERPL CALC-SCNC: 7 MMOL/L (ref 0–18)
AST SERPL-CCNC: 91 U/L (ref 15–41)
BASOPHILS # BLD: 0.1 K/UL (ref 0–0.2)
BASOPHILS NFR BLD: 1 %
BILIRUB SERPL-MCNC: 4.8 MG/DL (ref 0.3–1.2)
BUN SERPL-MCNC: 7 MG/DL (ref 8–20)
BUN/CREAT SERPL: 10.4 (ref 10–20)
CALCIUM SERPL-MCNC: 8.2 MG/DL (ref 8.5–10.5)
CHLORIDE SERPL-SCNC: 109 MMOL/L (ref 95–110)
CO2 SERPL-SCNC: 21 MMOL/L (ref 22–32)
CREAT SERPL-MCNC: 0.67 MG/DL (ref 0.5–1.5)
EOSINOPHIL # BLD: 0.1 K/UL (ref 0–0.7)
EOSINOPHIL NFR BLD: 1 %
ERYTHROCYTE [DISTWIDTH] IN BLOOD BY AUTOMATED COUNT: 20.8 % (ref 11–15)
GLOBULIN PLAS-MCNC: 4.3 G/DL (ref 2.5–3.7)
GLUCOSE SERPL-MCNC: 98 MG/DL (ref 70–99)
HCT VFR BLD AUTO: 19.2 % (ref 35–48)
HGB BLD-MCNC: 6.8 G/DL (ref 12–16)
LYMPHOCYTES # BLD: 4.5 K/UL (ref 1–4)
LYMPHOCYTES NFR BLD: 32 %
MCH RBC QN AUTO: 38.4 PG (ref 27–32)
MCHC RBC AUTO-ENTMCNC: 35.6 G/DL (ref 32–37)
MCV RBC AUTO: 107.7 FL (ref 80–100)
MONOCYTES # BLD: 2.2 K/UL (ref 0–1)
MONOCYTES NFR BLD: 16 %
NEUTROPHILS # BLD AUTO: 7 K/UL (ref 1.8–7.7)
NEUTROPHILS NFR BLD: 51 %
OSMOLALITY UR CALC.SUM OF ELEC: 282 MOSM/KG (ref 275–295)
PLATELET # BLD AUTO: 194 K/UL (ref 140–400)
PMV BLD AUTO: 9 FL (ref 7.4–10.3)
POTASSIUM SERPL-SCNC: 4.2 MMOL/L (ref 3.3–5.1)
PROT SERPL-MCNC: 7.2 G/DL (ref 5.9–8.4)
RBC # BLD AUTO: 1.78 M/UL (ref 3.7–5.4)
RETICS/RBC NFR AUTO: 18.5 % (ref 0.5–1.5)
SODIUM SERPL-SCNC: 137 MMOL/L (ref 136–144)
WBC # BLD AUTO: 13.9 K/UL (ref 4–11)

## 2019-01-13 PROCEDURE — 99222 1ST HOSP IP/OBS MODERATE 55: CPT | Performed by: INTERNAL MEDICINE

## 2019-01-13 RX ORDER — 0.9 % SODIUM CHLORIDE 0.9 %
VIAL (ML) INJECTION
Status: COMPLETED
Start: 2019-01-13 | End: 2019-01-13

## 2019-01-13 RX ORDER — LEVOFLOXACIN 5 MG/ML
750 INJECTION, SOLUTION INTRAVENOUS EVERY 24 HOURS
Status: COMPLETED | OUTPATIENT
Start: 2019-01-14 | End: 2019-01-20

## 2019-01-13 RX ORDER — SODIUM CHLORIDE 9 MG/ML
INJECTION, SOLUTION INTRAVENOUS CONTINUOUS
Status: DISCONTINUED | OUTPATIENT
Start: 2019-01-13 | End: 2019-01-25

## 2019-01-13 RX ORDER — HYDROMORPHONE HYDROCHLORIDE 1 MG/ML
1 INJECTION, SOLUTION INTRAMUSCULAR; INTRAVENOUS; SUBCUTANEOUS
Status: DISCONTINUED | OUTPATIENT
Start: 2019-01-13 | End: 2019-01-13

## 2019-01-13 RX ORDER — SODIUM CHLORIDE 0.9 % (FLUSH) 0.9 %
3 SYRINGE (ML) INJECTION AS NEEDED
Status: DISCONTINUED | OUTPATIENT
Start: 2019-01-13 | End: 2019-01-25

## 2019-01-13 RX ORDER — OXYCODONE AND ACETAMINOPHEN 10; 325 MG/1; MG/1
1 TABLET ORAL EVERY 4 HOURS PRN
Status: DISCONTINUED | OUTPATIENT
Start: 2019-01-13 | End: 2019-01-18

## 2019-01-13 RX ORDER — ENOXAPARIN SODIUM 100 MG/ML
40 INJECTION SUBCUTANEOUS NIGHTLY
Status: DISCONTINUED | OUTPATIENT
Start: 2019-01-13 | End: 2019-01-25

## 2019-01-13 RX ORDER — FOLIC ACID 1 MG/1
5 TABLET ORAL DAILY
Status: DISCONTINUED | OUTPATIENT
Start: 2019-01-13 | End: 2019-01-25

## 2019-01-13 RX ORDER — DIPHENHYDRAMINE HCL 25 MG
25 CAPSULE ORAL EVERY 4 HOURS PRN
Status: DISCONTINUED | OUTPATIENT
Start: 2019-01-13 | End: 2019-01-13

## 2019-01-13 RX ORDER — METOCLOPRAMIDE HYDROCHLORIDE 5 MG/ML
10 INJECTION INTRAMUSCULAR; INTRAVENOUS EVERY 8 HOURS PRN
Status: DISCONTINUED | OUTPATIENT
Start: 2019-01-13 | End: 2019-01-13

## 2019-01-13 RX ORDER — METOCLOPRAMIDE HYDROCHLORIDE 5 MG/ML
10 INJECTION INTRAMUSCULAR; INTRAVENOUS EVERY 6 HOURS PRN
Status: DISCONTINUED | OUTPATIENT
Start: 2019-01-13 | End: 2019-01-25

## 2019-01-13 RX ORDER — DIPHENHYDRAMINE HYDROCHLORIDE 50 MG/ML
25 INJECTION INTRAMUSCULAR; INTRAVENOUS EVERY 4 HOURS PRN
Status: DISCONTINUED | OUTPATIENT
Start: 2019-01-13 | End: 2019-01-18

## 2019-01-13 RX ORDER — HYDROMORPHONE HYDROCHLORIDE 1 MG/ML
2 INJECTION, SOLUTION INTRAMUSCULAR; INTRAVENOUS; SUBCUTANEOUS
Status: DISCONTINUED | OUTPATIENT
Start: 2019-01-13 | End: 2019-01-18

## 2019-01-13 RX ORDER — HYDROMORPHONE HYDROCHLORIDE 1 MG/ML
0.5 INJECTION, SOLUTION INTRAMUSCULAR; INTRAVENOUS; SUBCUTANEOUS
Status: DISCONTINUED | OUTPATIENT
Start: 2019-01-13 | End: 2019-01-13

## 2019-01-13 RX ORDER — ACETAMINOPHEN 325 MG/1
650 TABLET ORAL EVERY 6 HOURS PRN
Status: DISCONTINUED | OUTPATIENT
Start: 2019-01-13 | End: 2019-01-25

## 2019-01-13 NOTE — ED PROVIDER NOTES
Patient Seen in: [facility] AND [facility] Emergency Department    History   Patient presents with:  Sickle Cell (hematologic)    Stated Complaint: sickle cell/ pain all over      HPI    32 yo F with PMH sickle cell disease, HCV presneting with diffuse myalgias, c reviewed from today and agreed except as otherwise stated in HPI.     Physical Exam     ED Triage Vitals [01/12/19 1903]   /57   Pulse 100   Resp 18   Temp 98.4 °F (36.9 °C)   Temp src    SpO2 92 %   O2 Device None (Room air)       Current:/57 1.5 (*)     Anisocytosis 4+ (*)     Macrocytosis 2+ (*)     Polychromasia 3+ (*)     Sickle Cells 4+ (*)     All other components within normal limits   EMH POCT PREGNANCY URINE - Normal   INFLUENZA A/B AND RSV PCR - Normal   CBC WITH DIFFERENTIAL WITH [place] crisis, CAP/ACS, anemia, aplastic crisis. Pulse ox: 95%:Normal on RA, as interpreted by myself    Evaluation for typical sickle cell pain in patient without c/o CP/SOB in AF/HDS, nontoxic patient.  Initial SpO2 noted and with downtrend for which abx/BCx

## 2019-01-13 NOTE — ED NOTES
Pt to ED with c/o \"all over\" body pain due to her sickle cell disease. States she medicated with percocet and dilaudid earlier today, but felt that they did not help. She feels the cold weather may have triggered the flair up.

## 2019-01-13 NOTE — H&P
[facility] Patient Status:  Inpatient    1990 MRN [id number]   Location [facility] 4W/SW/SE Attending London Lemus MD   Hosp Day # 0 PCP No primary care provider on file. 115/55, pulse 76, temperature 98 °F (36.7 °C), temperature source Oral, resp. rate 18, height 5' 9\" (1.753 m), weight 130 lb (59 kg), last menstrual period 12/08/2018, SpO2 94 %. General: No acute distress. Alert and oriented x 3.   HEENT: Moist mucous control  - IVF  - Oxygen   - hematology consutl  - monitor blood counts  - d/w Dr. Sim Cohen     Anemia  - secondary to above  - repeat in am      Elevated LFT  - secondary to above  - trending down  - continue to monitor- hydration        Opacity of lung on im

## 2019-01-14 LAB
ALBUMIN SERPL BCP-MCNC: 3.2 G/DL (ref 3.5–4.8)
ALBUMIN/GLOB SERPL: 0.7 {RATIO} (ref 1–2)
ALP SERPL-CCNC: 258 U/L (ref 32–100)
ALT SERPL-CCNC: 57 U/L (ref 14–54)
ANION GAP SERPL CALC-SCNC: 10 MMOL/L (ref 0–18)
AST SERPL-CCNC: 97 U/L (ref 15–41)
BASOPHILS # BLD: 0 K/UL (ref 0–0.2)
BASOPHILS NFR BLD: 0 %
BILIRUB SERPL-MCNC: 5.6 MG/DL (ref 0.3–1.2)
BUN SERPL-MCNC: 6 MG/DL (ref 8–20)
BUN/CREAT SERPL: 9.2 (ref 10–20)
CALCIUM SERPL-MCNC: 8.5 MG/DL (ref 8.5–10.5)
CHLORIDE SERPL-SCNC: 106 MMOL/L (ref 95–110)
CO2 SERPL-SCNC: 20 MMOL/L (ref 22–32)
CREAT SERPL-MCNC: 0.65 MG/DL (ref 0.5–1.5)
EOSINOPHIL # BLD: 0 K/UL (ref 0–0.7)
EOSINOPHIL NFR BLD: 0 %
ERYTHROCYTE [DISTWIDTH] IN BLOOD BY AUTOMATED COUNT: 20.3 % (ref 11–15)
FERRITIN SERPL IA-MCNC: 3350 NG/ML (ref 11–307)
GLOBULIN PLAS-MCNC: 4.5 G/DL (ref 2.5–3.7)
GLUCOSE SERPL-MCNC: 96 MG/DL (ref 70–99)
HCT VFR BLD AUTO: 18.7 % (ref 35–48)
HGB BLD-MCNC: 6.7 G/DL (ref 12–16)
LYMPHOCYTES # BLD: 5 K/UL (ref 1–4)
LYMPHOCYTES NFR BLD: 31 %
MCH RBC QN AUTO: 37.5 PG (ref 27–32)
MCHC RBC AUTO-ENTMCNC: 35.9 G/DL (ref 32–37)
MCV RBC AUTO: 104.5 FL (ref 80–100)
MONOCYTES # BLD: 1.3 K/UL (ref 0–1)
MONOCYTES NFR BLD: 8 %
NEUTROPHILS # BLD AUTO: 9.8 K/UL (ref 1.8–7.7)
NEUTROPHILS NFR BLD: 54 %
NEUTS BAND NFR BLD: 7 %
NRBC BLD-RTO: 5 % (ref ?–1)
OSMOLALITY UR CALC.SUM OF ELEC: 279 MOSM/KG (ref 275–295)
PLATELET # BLD AUTO: 163 K/UL (ref 140–400)
PMV BLD AUTO: 9.3 FL (ref 7.4–10.3)
POTASSIUM SERPL-SCNC: 4.4 MMOL/L (ref 3.3–5.1)
PROT SERPL-MCNC: 7.7 G/DL (ref 5.9–8.4)
RBC # BLD AUTO: 1.79 M/UL (ref 3.7–5.4)
SODIUM SERPL-SCNC: 136 MMOL/L (ref 136–144)
WBC # BLD AUTO: 16.1 K/UL (ref 4–11)

## 2019-01-14 PROCEDURE — 99232 SBSQ HOSP IP/OBS MODERATE 35: CPT | Performed by: INTERNAL MEDICINE

## 2019-01-14 NOTE — PLAN OF CARE
Altered Communication/Language Barrier    • Patient/Family is able to understand and participate in their care Not Progressing        DISCHARGE PLANNING    • Discharge to home or other facility with appropriate resources Not Progressing        PAIN - ADULT

## 2019-01-14 NOTE — PROGRESS NOTES
[place] [facility] - [facility]    Progress Note    [facility] Status:  In[facility]    1990 MRN [id number]   Location [facility] 4W/SW/SE Attending Kelly Mathew MD   Hosp Day # 1 PCP No primary care provider on file. 194 01/13/2019    CREATSERUM 0.65 01/14/2019    BUN 6 (L) 01/14/2019     01/14/2019    K 4.4 01/14/2019     01/14/2019    CO2 20 (L) 01/14/2019    GLU 96 01/14/2019    CA 8.5 01/14/2019    ALB 3.2 (L) 01/14/2019    ALKPHO 258 (H) 01/14/2019

## 2019-01-14 NOTE — PROGRESS NOTES
[facility]    Hematology/Oncology   Progress Note    Harry Arredondo Patient Status:  Inpatient    1990 MRN [id number]   Location [facility] 4W/SW/SE Attending Yuko Baldwin MD   Hosp Day # 1 PCP No primary care female with a history of hemoglobin SS disease, iron overload, remote history of pulmonary embolus now off anticoagulation, hepatitis C, admitted to hospital with acute sickle cell crisis.     --IV volume replacement with normal saline fluids.   We will als

## 2019-01-14 NOTE — CONSULTS
[facility]    PATIENT'S NAME: Tulio John   ATTENDING PHYSICIAN: Mike Acevedo MD   CONSULTING PHYSICIAN: Nilam Flanagan.  MD Jay   PATIENT ACCOUNT#:   [id number]    LOCATION:  [address] [place] [address] RECORD #:   [id number]       DATE OF BIRTH: Temperature is 36.6, pulse 81, respiratory rate 18, blood pressure is 106/63, pulse ox 96% on room air. Weight is 59 kg. HEENT:  Moist mucous membranes. Oropharynx clear. NECK:  Supple. LUNGS:  Symmetric expansion, nonlabored breathing.   HEART:

## 2019-01-15 LAB
ALBUMIN SERPL BCP-MCNC: 3.1 G/DL (ref 3.5–4.8)
ALBUMIN/GLOB SERPL: 0.7 {RATIO} (ref 1–2)
ALP SERPL-CCNC: 238 U/L (ref 32–100)
ALT SERPL-CCNC: 51 U/L (ref 14–54)
ANION GAP SERPL CALC-SCNC: 7 MMOL/L (ref 0–18)
AST SERPL-CCNC: 88 U/L (ref 15–41)
BASOPHILS # BLD: 0.1 K/UL (ref 0–0.2)
BASOPHILS NFR BLD: 1 %
BILIRUB SERPL-MCNC: 6.1 MG/DL (ref 0.3–1.2)
BUN SERPL-MCNC: 7 MG/DL (ref 8–20)
BUN/CREAT SERPL: 11.1 (ref 10–20)
CALCIUM SERPL-MCNC: 8.3 MG/DL (ref 8.5–10.5)
CHLORIDE SERPL-SCNC: 106 MMOL/L (ref 95–110)
CO2 SERPL-SCNC: 22 MMOL/L (ref 22–32)
CREAT SERPL-MCNC: 0.63 MG/DL (ref 0.5–1.5)
EOSINOPHIL # BLD: 0.2 K/UL (ref 0–0.7)
EOSINOPHIL NFR BLD: 1 %
ERYTHROCYTE [DISTWIDTH] IN BLOOD BY AUTOMATED COUNT: 21.6 % (ref 11–15)
GLOBULIN PLAS-MCNC: 4.2 G/DL (ref 2.5–3.7)
GLUCOSE SERPL-MCNC: 93 MG/DL (ref 70–99)
HCT VFR BLD AUTO: 17.6 % (ref 35–48)
HGB BLD-MCNC: 6.4 G/DL (ref 12–16)
LYMPHOCYTES # BLD: 4.1 K/UL (ref 1–4)
LYMPHOCYTES NFR BLD: 29 %
MCH RBC QN AUTO: 37.5 PG (ref 27–32)
MCHC RBC AUTO-ENTMCNC: 36.6 G/DL (ref 32–37)
MCV RBC AUTO: 102.5 FL (ref 80–100)
MONOCYTES # BLD: 2.1 K/UL (ref 0–1)
MONOCYTES NFR BLD: 15 %
NEUTROPHILS # BLD AUTO: 7.4 K/UL (ref 1.8–7.7)
NEUTROPHILS NFR BLD: 53 %
OSMOLALITY UR CALC.SUM OF ELEC: 278 MOSM/KG (ref 275–295)
PLATELET # BLD AUTO: 205 K/UL (ref 140–400)
PMV BLD AUTO: 8.8 FL (ref 7.4–10.3)
POTASSIUM SERPL-SCNC: 4 MMOL/L (ref 3.3–5.1)
PROT SERPL-MCNC: 7.3 G/DL (ref 5.9–8.4)
RBC # BLD AUTO: 1.71 M/UL (ref 3.7–5.4)
SODIUM SERPL-SCNC: 135 MMOL/L (ref 136–144)
WBC # BLD AUTO: 13.9 K/UL (ref 4–11)

## 2019-01-15 PROCEDURE — 99232 SBSQ HOSP IP/OBS MODERATE 35: CPT | Performed by: INTERNAL MEDICINE

## 2019-01-15 NOTE — PROGRESS NOTES
[place] [facility] - [facility]    Progress Note    Vinicio Bowsercarmine Patient Status:  Inpatient    1990 MRN [id number]   Location [facility] 4W/SW/SE Attending Jazmyne Stone MD   Hosp Day # 2 PCP No primary care provider on file. CREATSERUM 0.63 01/15/2019    BUN 7 (L) 01/15/2019     (L) 01/15/2019    K 4.0 01/15/2019     01/15/2019    CO2 22 01/15/2019    GLU 93 01/15/2019    CA 8.3 (L) 01/15/2019    ALB 3.1 (L) 01/15/2019    ALKPHO 238 (H) 01/15/2019    BILT 6.1 (H) 0

## 2019-01-15 NOTE — PAYOR COMM NOTE
--------------  ADMISSION REVIEW     Payor: Margarita Poolez #:  [id number]  Authorization Number: [id number]    Admit date: 1/13/19  Admit time: 5351       Admitting Physician: Silverio Vargas MD  Attending Physician:  Silverio Vargas MD  Pr Mother      Review of Systems :  Constitutional: As per HPI  Respiratory: (+) cough. Gastrointestinal: Negative for vomiting and abdominal pain. Genitourinary: Negative for dysuria and hematuria.    Positive for stated complaint: sickle cell/ pain all ov normal limits   PROCALCITONIN - Abnormal; Notable for the following components:    Procalcitonin 0.31 (*)     All other components within normal limits   CBC W/ DIFFERENTIAL - Abnormal; Notable for the following components:    WBC 15.3 (*)     RBC 2.12 (*) Impression:  Sickle cell crisis ([facility] [address]  (primary encounter diagnosis)  Hypoxia  Opacity of lung on imaging study    Disposition:  Fran Solis MD on 1/12/2019 11:55 PM    H&P signed by Ana Bejarano MD at 1/13/2019  6:23 PM     Author:  Eloise Aragon (1.753 m), weight 130 lb (59 kg), last menstrual period 12/08/2018, SpO2 94 %. General: No acute distress. Alert and oriented x 3. HEENT: Moist mucous membranes. EOM-I. PERRL  Neck: No lymphadenopathy. No JVD. No carotid bruits.   Respiratory: Clear t Opacity of lung on imaging study  Likely community acquired pneumonia  - complete 7 days of levaquin  - respiratory symptoms improving.      D/w patient and RN   Patient needs to establish outpatient care with PCP and hematologist  FULL CODE  Jadyn DOMINGUEZ RN    1/14/2019 2049 New Bag (none) Intravenous Bakari Cabrera RN    1/14/2019 1717 Rate/Dose Change (none) Intravenous Ajith Ochoa RN    1/14/2019 1407 New Bag (none) Intravenous Ajith Ochoa RN          REVIEWER COMMENTS:     FOR REVIEW/AP

## 2019-01-15 NOTE — PROGRESS NOTES
[facility]    Hematology/Oncology   Progress Note    July Arredondo Patient Status:  Inpatient    1990 MRN [id number]   Location [facility] 4W/SW/SE Attending Luigi Torres MD   [facility] Day # 2 PCP No primary care hemoglobin SS disease, iron overload, remote history of pulmonary embolus now off anticoagulation, hepatitis C, admitted to hospital with acute sickle cell crisis.     --IV volume replacement with normal saline fluids.   We will also give narcotic analgesic

## 2019-01-16 LAB
ANTIBODY SCREEN: NEGATIVE
BASOPHILS # BLD: 0.1 K/UL (ref 0–0.2)
BASOPHILS NFR BLD: 0 %
EOSINOPHIL # BLD: 0.1 K/UL (ref 0–0.7)
EOSINOPHIL NFR BLD: 1 %
ERYTHROCYTE [DISTWIDTH] IN BLOOD BY AUTOMATED COUNT: 21.8 % (ref 11–15)
HCT VFR BLD AUTO: 14.8 % (ref 35–48)
HGB BLD-MCNC: 5.6 G/DL (ref 12–16)
LYMPHOCYTES # BLD: 4.5 K/UL (ref 1–4)
LYMPHOCYTES NFR BLD: 30 %
MCH RBC QN AUTO: 37.3 PG (ref 27–32)
MCHC RBC AUTO-ENTMCNC: 37.6 G/DL (ref 32–37)
MCV RBC AUTO: 99.1 FL (ref 80–100)
MONOCYTES # BLD: 2.7 K/UL (ref 0–1)
MONOCYTES NFR BLD: 18 %
NEUTROPHILS # BLD AUTO: 7.6 K/UL (ref 1.8–7.7)
NEUTROPHILS NFR BLD: 51 %
PLATELET # BLD AUTO: 205 K/UL (ref 140–400)
PMV BLD AUTO: 8.6 FL (ref 7.4–10.3)
RBC # BLD AUTO: 1.49 M/UL (ref 3.7–5.4)
RH BLOOD TYPE: POSITIVE
WBC # BLD AUTO: 15 K/UL (ref 4–11)

## 2019-01-16 PROCEDURE — 30233N1 TRANSFUSION OF NONAUTOLOGOUS RED BLOOD CELLS INTO PERIPHERAL VEIN, PERCUTANEOUS APPROACH: ICD-10-PCS | Performed by: INTERNAL MEDICINE

## 2019-01-16 PROCEDURE — 99232 SBSQ HOSP IP/OBS MODERATE 35: CPT | Performed by: INTERNAL MEDICINE

## 2019-01-16 RX ORDER — SODIUM CHLORIDE 9 MG/ML
INJECTION, SOLUTION INTRAVENOUS ONCE
Status: COMPLETED | OUTPATIENT
Start: 2019-01-16 | End: 2019-01-16

## 2019-01-16 RX ORDER — SODIUM CHLORIDE 0.9 % (FLUSH) 0.9 %
10 SYRINGE (ML) INJECTION AS NEEDED
Status: DISCONTINUED | OUTPATIENT
Start: 2019-01-16 | End: 2019-01-25

## 2019-01-16 NOTE — PROGRESS NOTES
Patient alert and oriented X4, vitals stable. Tolerating diet. Continues with IVFs. Informed Dr Benedict Lira of 6.4 hemoglobin and he stated no need to call unless less than 6. Tele in place. NO distress noted.

## 2019-01-16 NOTE — PROGRESS NOTES
[place] [facility]    Progress Note    Vinicio Suzie Patient Status:  Inpatient    1990 MRN [id number]   Location [facility] 4W/SW/SE Attending Len Gale MD   Hosp Day # 3 PCP No primary care provider on file. 01/16/2019    CREATSERUM 0.63 01/15/2019    BUN 7 (L) 01/15/2019     (L) 01/15/2019    K 4.0 01/15/2019     01/15/2019    CO2 22 01/15/2019    GLU 93 01/15/2019    CA 8.3 (L) 01/15/2019    ALB 3.1 (L) 01/15/2019    ALKPHO 238 (H) 01/15/2019

## 2019-01-16 NOTE — PROGRESS NOTES
Patient alert and oriented X4, vitals stable. Continues with IVFs. Plans for 1 unit pRBCs. Spoke with Dr Harshil Carpenter about special instructions \"sickle cell screening\" and was informed to just order a regular unit.  Spoke with Blood bank who inquired about previ

## 2019-01-17 ENCOUNTER — APPOINTMENT (OUTPATIENT)
Dept: PICC SERVICES | Facility: HOSPITAL | Age: 29
DRG: 811 | End: 2019-01-17
Attending: INTERNAL MEDICINE
Payer: COMMERCIAL

## 2019-01-17 LAB
ALBUMIN SERPL BCP-MCNC: 3.3 G/DL (ref 3.5–4.8)
ALBUMIN/GLOB SERPL: 0.7 {RATIO} (ref 1–2)
ALP SERPL-CCNC: 241 U/L (ref 32–100)
ALT SERPL-CCNC: 60 U/L (ref 14–54)
ANION GAP SERPL CALC-SCNC: 6 MMOL/L (ref 0–18)
AST SERPL-CCNC: 110 U/L (ref 15–41)
BASOPHILS # BLD: 0.1 K/UL (ref 0–0.2)
BASOPHILS NFR BLD: 1 %
BILIRUB DIRECT SERPL-MCNC: 2.6 MG/DL (ref 0–0.2)
BILIRUB SERPL-MCNC: 6.9 MG/DL (ref 0.3–1.2)
BUN SERPL-MCNC: 5 MG/DL (ref 8–20)
BUN/CREAT SERPL: 6.8 (ref 10–20)
CALCIUM SERPL-MCNC: 8.8 MG/DL (ref 8.5–10.5)
CHLORIDE SERPL-SCNC: 105 MMOL/L (ref 95–110)
CO2 SERPL-SCNC: 24 MMOL/L (ref 22–32)
CREAT SERPL-MCNC: 0.74 MG/DL (ref 0.5–1.5)
EOSINOPHIL # BLD: 0.1 K/UL (ref 0–0.7)
EOSINOPHIL NFR BLD: 1 %
ERYTHROCYTE [DISTWIDTH] IN BLOOD BY AUTOMATED COUNT: 21.4 % (ref 11–15)
GLOBULIN PLAS-MCNC: 4.6 G/DL (ref 2.5–3.7)
GLUCOSE SERPL-MCNC: 118 MG/DL (ref 70–99)
HCT VFR BLD AUTO: 19.1 % (ref 35–48)
HGB BLD-MCNC: 7.2 G/DL (ref 12–16)
LYMPHOCYTES # BLD: 4 K/UL (ref 1–4)
LYMPHOCYTES NFR BLD: 27 %
MCH RBC QN AUTO: 36.1 PG (ref 27–32)
MCHC RBC AUTO-ENTMCNC: 37.8 G/DL (ref 32–37)
MCV RBC AUTO: 95.5 FL (ref 80–100)
MONOCYTES # BLD: 2.5 K/UL (ref 0–1)
MONOCYTES NFR BLD: 17 %
NEUTROPHILS # BLD AUTO: 8.4 K/UL (ref 1.8–7.7)
NEUTROPHILS NFR BLD: 55 %
OSMOLALITY UR CALC.SUM OF ELEC: 278 MOSM/KG (ref 275–295)
PLATELET # BLD AUTO: 230 K/UL (ref 140–400)
PMV BLD AUTO: 8.5 FL (ref 7.4–10.3)
POTASSIUM SERPL-SCNC: 4.4 MMOL/L (ref 3.3–5.1)
PROT SERPL-MCNC: 7.9 G/DL (ref 5.9–8.4)
RBC # BLD AUTO: 1.99 M/UL (ref 3.7–5.4)
SODIUM SERPL-SCNC: 135 MMOL/L (ref 136–144)
WBC # BLD AUTO: 15.1 K/UL (ref 4–11)

## 2019-01-17 PROCEDURE — 99231 SBSQ HOSP IP/OBS SF/LOW 25: CPT | Performed by: INTERNAL MEDICINE

## 2019-01-17 PROCEDURE — 02HV33Z INSERTION OF INFUSION DEVICE INTO SUPERIOR VENA CAVA, PERCUTANEOUS APPROACH: ICD-10-PCS | Performed by: INTERNAL MEDICINE

## 2019-01-17 PROCEDURE — 99254 IP/OBS CNSLTJ NEW/EST MOD 60: CPT | Performed by: INTERNAL MEDICINE

## 2019-01-17 RX ORDER — SODIUM CHLORIDE 0.9 % (FLUSH) 0.9 %
10 SYRINGE (ML) INJECTION AS NEEDED
Status: DISCONTINUED | OUTPATIENT
Start: 2019-01-17 | End: 2019-01-25

## 2019-01-17 RX ORDER — LIDOCAINE HYDROCHLORIDE 10 MG/ML
0.5 INJECTION, SOLUTION INFILTRATION; PERINEURAL ONCE AS NEEDED
Status: ACTIVE | OUTPATIENT
Start: 2019-01-17 | End: 2019-01-17

## 2019-01-17 NOTE — PROGRESS NOTES
[facility]    Progress Note    Sanjana Smaller Arnulfo Patient Status:  Inpatient    1990 MRN [id number]   Location [facility] 4W/SW/SE Attending Dee Palmer MD   Hosp Day # 4 PCP No primary care provider on file. 01/17/2019    HCT 19.1 (L) 01/17/2019     01/17/2019    CREATSERUM 0.74 01/17/2019    BUN 5 (L) 01/17/2019     (L) 01/17/2019    K 4.4 01/17/2019     01/17/2019    CO2 24 01/17/2019     (H) 01/17/2019    CA 8.8 01/17/2019    ALB 3

## 2019-01-17 NOTE — PROGRESS NOTES
Vascular Access Note  Powerglide/Midline Catheter Insertion     Vascular Access Screening:   Allergies to Lidocaine: no  Allergies to Latex: no  Presence of Pacemaker/Defibrillator: No  Mastectomy with Lymph Node Dissection: No  AV Fistula / AV Graft: No  D

## 2019-01-17 NOTE — PAYOR COMM NOTE
--------------  CONTINUED STAY REVIEW    Payor: Christiano Karissa #:  [id number]  Authorization Number: [id number]    Admit date: 1/13/19  Admit time: 2496    Admitting Physician: Dimitris Mistry MD  Attending Physician:  Dimitris Mistry MD  P oxyCODONE-acetaminophen (PERCOCET)  MG per tab 1 tablet     Date Action Dose Route User    1/17/2019 0831 Given 1 tablet Oral Zulema Freeman RN    1/16/2019 1421 Given 1 tablet Oral Virginie Cazares, RN      0.9%  NaCl infusion     Date Act

## 2019-01-17 NOTE — PROGRESS NOTES
[facility]    Hematology/Oncology   Progress Note    Adenike Arredondo Patient Status:  Inpatient    1990 MRN [id number]   Location [facility] 4W/SW/SE Attending Renato lAfaro MD   Hosp Day # 3 PCP No primary care and give 1 unit    For follow up we recommend [facility].   Unfortunately here insurance is not accepted for outpatient care her at [facility].     Dr. Judith Jovel will cover tomorrow and Friday    Reynaldo Adames MD

## 2019-01-17 NOTE — CONSULTS
[facility]   Gastroenterology Consultation Note    Tristan Arredondo  Patient Status:    Inpatient  Date of Admission:         1/12/2019, Hospital day #4  Attending:   Gaila Homans, MD  PCP:     No primary care provider on file History   Problem Relation Age of Onset   • Hypertension Mother       reports that  has never smoked. she has never used smokeless tobacco. She reports that she does not drink alcohol or use drugs.     Allergies:    Morphine                  Rocephin [Ceftr intolerance  MUSCULOSKELETAL:  negative for joint effusion/severe erythema  BEHAVIOR/PSYCH:  negative for psychotic behavior    Physical Exam:    Blood pressure 125/65, pulse 104, temperature 97.9 °F (36.6 °C), temperature source Axillary, resp.  rate 16, h hyperbilirubinemia likely multifactorial - d/t hemolytic state in this patient with sickle cell disease, Hep C and/or iron overload. Previous July 2018 US of the liver w/o evidence of cholelithiasis or acute cholecystitis - no hepatobiliary dilatation.  Low

## 2019-01-17 NOTE — PAYOR COMM NOTE
--------------  CONTINUED STAY REVIEW    Payor: Hilary Eladio #:  [id number]  Authorization Number: [id number]    Admit date: 1/13/19  Admit time: 6453    Admitting Physician: London Lemus MD  Attending Physician:  MD GREG Fung 01/15/2019     CA 8.3 01/15/2019     ALB 3.1 01/15/2019     ALKPHO 238 01/15/2019     BILT 6.1 01/15/2019     TP 7.3 01/15/2019     AST 88 01/15/2019     ALT 51 01/15/2019     Assessment and Plan:  IM    1- Sickle cell crisis  - continue IV pain control  - Michael Hilton RN      HYDROmorphone HCl (DILAUDID) 1 MG/ML injection 2 mg     Date Action Dose Route User    1/16/2019 1846 Given 2 mg Intravenous Meka Rust RN    1/16/2019 1534 Given 2 mg Intravenous Meka Rust RN    1/16/2019 1212 Given 2 m

## 2019-01-18 ENCOUNTER — APPOINTMENT (OUTPATIENT)
Dept: ULTRASOUND IMAGING | Facility: HOSPITAL | Age: 29
DRG: 811 | End: 2019-01-18
Attending: INTERNAL MEDICINE
Payer: COMMERCIAL

## 2019-01-18 LAB
ALBUMIN SERPL BCP-MCNC: 3.2 G/DL (ref 3.5–4.8)
ALBUMIN/GLOB SERPL: 0.7 {RATIO} (ref 1–2)
ALP SERPL-CCNC: 220 U/L (ref 32–100)
ALT SERPL-CCNC: 52 U/L (ref 14–54)
ANION GAP SERPL CALC-SCNC: 9 MMOL/L (ref 0–18)
AST SERPL-CCNC: 93 U/L (ref 15–41)
BASOPHILS # BLD: 0.1 K/UL (ref 0–0.2)
BASOPHILS NFR BLD: 1 %
BILIRUB SERPL-MCNC: 5.5 MG/DL (ref 0.3–1.2)
BLOOD TYPE BARCODE: 9500
BUN SERPL-MCNC: 8 MG/DL (ref 8–20)
BUN/CREAT SERPL: 11.8 (ref 10–20)
CALCIUM SERPL-MCNC: 8.7 MG/DL (ref 8.5–10.5)
CHLORIDE SERPL-SCNC: 105 MMOL/L (ref 95–110)
CO2 SERPL-SCNC: 22 MMOL/L (ref 22–32)
CREAT SERPL-MCNC: 0.68 MG/DL (ref 0.5–1.5)
EOSINOPHIL # BLD: 0.1 K/UL (ref 0–0.7)
EOSINOPHIL NFR BLD: 1 %
ERYTHROCYTE [DISTWIDTH] IN BLOOD BY AUTOMATED COUNT: 22.2 % (ref 11–15)
GLOBULIN PLAS-MCNC: 4.4 G/DL (ref 2.5–3.7)
GLUCOSE SERPL-MCNC: 102 MG/DL (ref 70–99)
HAV AB SER QL IA: NONREACTIVE
HBV CORE AB SERPL QL IA: NONREACTIVE
HBV SURFACE AB SER-ACNC: 907.36 MIU/ML (ref ?–10)
HBV SURFACE AG SERPL QL IA: NONREACTIVE
HBV SURFACE AG SERPL QL IA: REACTIVE
HCT VFR BLD AUTO: 18.9 % (ref 35–48)
HCV AB SERPL QL IA: REACTIVE
HGB BLD-MCNC: 7 G/DL (ref 12–16)
INR BLD: 1.2 (ref 0.9–1.2)
LYMPHOCYTES # BLD: 3.8 K/UL (ref 1–4)
LYMPHOCYTES NFR BLD: 25 %
MCH RBC QN AUTO: 36.4 PG (ref 27–32)
MCHC RBC AUTO-ENTMCNC: 37.1 G/DL (ref 32–37)
MCV RBC AUTO: 98.2 FL (ref 80–100)
MONOCYTES # BLD: 2.5 K/UL (ref 0–1)
MONOCYTES NFR BLD: 16 %
NEUTROPHILS # BLD AUTO: 9 K/UL (ref 1.8–7.7)
NEUTROPHILS NFR BLD: 58 %
OSMOLALITY UR CALC.SUM OF ELEC: 281 MOSM/KG (ref 275–295)
PLATELET # BLD AUTO: 235 K/UL (ref 140–400)
PMV BLD AUTO: 8.9 FL (ref 7.4–10.3)
POTASSIUM SERPL-SCNC: 4 MMOL/L (ref 3.3–5.1)
PROT SERPL-MCNC: 7.6 G/DL (ref 5.9–8.4)
PROTHROMBIN TIME: 14.5 SECONDS (ref 11.8–14.5)
RBC # BLD AUTO: 1.93 M/UL (ref 3.7–5.4)
SODIUM SERPL-SCNC: 136 MMOL/L (ref 136–144)
WBC # BLD AUTO: 15.5 K/UL (ref 4–11)

## 2019-01-18 PROCEDURE — 99232 SBSQ HOSP IP/OBS MODERATE 35: CPT | Performed by: PHYSICIAN ASSISTANT

## 2019-01-18 PROCEDURE — 76705 ECHO EXAM OF ABDOMEN: CPT | Performed by: INTERNAL MEDICINE

## 2019-01-18 RX ORDER — 0.9 % SODIUM CHLORIDE 0.9 %
VIAL (ML) INJECTION
Status: COMPLETED
Start: 2019-01-18 | End: 2019-01-18

## 2019-01-18 RX ORDER — OXYCODONE AND ACETAMINOPHEN 10; 325 MG/1; MG/1
2 TABLET ORAL EVERY 4 HOURS PRN
Status: DISCONTINUED | OUTPATIENT
Start: 2019-01-18 | End: 2019-01-25

## 2019-01-18 RX ORDER — DIPHENHYDRAMINE HCL 25 MG
25 CAPSULE ORAL EVERY 4 HOURS PRN
Status: DISCONTINUED | OUTPATIENT
Start: 2019-01-18 | End: 2019-01-25

## 2019-01-18 RX ORDER — HYDROMORPHONE HYDROCHLORIDE 1 MG/ML
2 INJECTION, SOLUTION INTRAMUSCULAR; INTRAVENOUS; SUBCUTANEOUS EVERY 4 HOURS PRN
Status: DISCONTINUED | OUTPATIENT
Start: 2019-01-18 | End: 2019-01-24

## 2019-01-18 NOTE — PROGRESS NOTES
[facility]     Gastroenterology Progress Note    Ruthy Arredondo Patient Status:  Inpatient    1990 MRN [id number]   Location [facility] 4W/SW/SE Attending Silverio Vargas MD   Hosp Day # 5 PCP No primary car no hepatobiliary dilatation. Hepatomegaly and inc echogenicity on US in hospital, no overt masses.     #Elevated Ferritin/Hx of Iron Overload: noted hematology recommendations regarding limiting transfusions.  Patient understands to f/u with outpatient hema

## 2019-01-18 NOTE — PROGRESS NOTES
[place] [facility] - [facility]    Progress Note    Shivam Arredondo Patient Status:  Inpatient    1990 MRN [id number]   Location [facility] 4W/SW/SE Attending Jay Lang MD   Hosp Day # 4 PCP No primary care provider on file. 01/17/2019    ALT 60 (H) 01/17/2019    LIP 46 07/21/2018                  Homa Baca MD  1/17/2019

## 2019-01-18 NOTE — PROGRESS NOTES
[place] [facility] - [facility]    Progress Note    Denise Arredondo Patient Status:  Inpatient    1990 MRN [id number]   Location [facility] 4W/SW/SE Attending Nemesio Contreras MD   Hosp Day # 5 PCP No primary care provider on file. [facility]  Total time spent- 40 min               Results:     Lab Results   Component Value Date    WBC 15.5 (H) 01/18/2019    HGB 7.0 (LL) 01/18/2019    HCT 18.9 (L) 01/18/2019     01/18/2019    CREATSERUM 0.68 01/18/2019    BUN 8 01/18/2019     0

## 2019-01-19 LAB
ALBUMIN SERPL BCP-MCNC: 2.9 G/DL (ref 3.5–4.8)
ALBUMIN/GLOB SERPL: 0.7 {RATIO} (ref 1–2)
ALP SERPL-CCNC: 215 U/L (ref 32–100)
ALT SERPL-CCNC: 47 U/L (ref 14–54)
ANION GAP SERPL CALC-SCNC: 8 MMOL/L (ref 0–18)
AST SERPL-CCNC: 82 U/L (ref 15–41)
BASOPHILS # BLD: 0.2 K/UL (ref 0–0.2)
BASOPHILS NFR BLD: 1 %
BILIRUB SERPL-MCNC: 4.7 MG/DL (ref 0.3–1.2)
BUN SERPL-MCNC: 9 MG/DL (ref 8–20)
BUN/CREAT SERPL: 12.9 (ref 10–20)
CALCIUM SERPL-MCNC: 8.4 MG/DL (ref 8.5–10.5)
CHLORIDE SERPL-SCNC: 107 MMOL/L (ref 95–110)
CO2 SERPL-SCNC: 22 MMOL/L (ref 22–32)
CREAT SERPL-MCNC: 0.7 MG/DL (ref 0.5–1.5)
EOSINOPHIL # BLD: 0.5 K/UL (ref 0–0.7)
EOSINOPHIL NFR BLD: 3 %
ERYTHROCYTE [DISTWIDTH] IN BLOOD BY AUTOMATED COUNT: 23.7 % (ref 11–15)
GLOBULIN PLAS-MCNC: 4.3 G/DL (ref 2.5–3.7)
GLUCOSE SERPL-MCNC: 110 MG/DL (ref 70–99)
HCT VFR BLD AUTO: 18.7 % (ref 35–48)
HGB BLD-MCNC: 6.6 G/DL (ref 12–16)
LYMPHOCYTES # BLD: 4.1 K/UL (ref 1–4)
LYMPHOCYTES NFR BLD: 27 %
MCH RBC QN AUTO: 35.2 PG (ref 27–32)
MCHC RBC AUTO-ENTMCNC: 35.4 G/DL (ref 32–37)
MCV RBC AUTO: 99.4 FL (ref 80–100)
METAMYELOCYTES # BLD MANUAL: 0.15 K/UL
METAMYELOCYTES NFR BLD: 1 %
MONOCYTES # BLD: 2.7 K/UL (ref 0–1)
MONOCYTES NFR BLD: 18 %
NEUTROPHILS # BLD AUTO: 7.6 K/UL (ref 1.8–7.7)
NEUTROPHILS NFR BLD: 47 %
NEUTS BAND NFR BLD: 3 %
NRBC BLD-RTO: 8 % (ref ?–1)
OSMOLALITY UR CALC.SUM OF ELEC: 283 MOSM/KG (ref 275–295)
PLATELET # BLD AUTO: 236 K/UL (ref 140–400)
PMV BLD AUTO: 8.4 FL (ref 7.4–10.3)
POTASSIUM SERPL-SCNC: 3.9 MMOL/L (ref 3.3–5.1)
PROT SERPL-MCNC: 7.2 G/DL (ref 5.9–8.4)
RBC # BLD AUTO: 1.88 M/UL (ref 3.7–5.4)
RETICS/RBC NFR AUTO: 18.1 % (ref 0.5–1.5)
SODIUM SERPL-SCNC: 137 MMOL/L (ref 136–144)
WBC # BLD AUTO: 15.1 K/UL (ref 4–11)

## 2019-01-19 PROCEDURE — 99232 SBSQ HOSP IP/OBS MODERATE 35: CPT | Performed by: INTERNAL MEDICINE

## 2019-01-19 PROCEDURE — 99231 SBSQ HOSP IP/OBS SF/LOW 25: CPT | Performed by: INTERNAL MEDICINE

## 2019-01-19 NOTE — PLAN OF CARE
Problem: Patient/Family Goals  Goal: Patient/Family Long Term Goal  Patient's Long Term Goal: Go home    Interventions:  - ambulate  - advance diet as tolerated  - monitor VS    - See additional Care Plan goals for specific interventions   Outcome: Progres reduce risk of injury  - Provide assistive devices as appropriate  - Consider OT/PT consult to assist with strengthening/mobility  - Encourage toileting schedule  Outcome: Progressing      Problem: DISCHARGE PLANNING  Goal: Discharge to home or other facil

## 2019-01-19 NOTE — PROGRESS NOTES
[place] [facility] - [facility]    Progress Note    Jono Room Arnulfo Patient Status:  Inpatient    1990 MRN [id number]   Location [facility] 4W/SW/SE Attending Kat Phelps MD   Hosp Day # 5 PCP No primary care provider on file. diagnosis (? treatment not covered by medicaid ? ?)  Appreciate referral to hepatologist.       Hypoxia  Patient has previous history of PE and was anticoagulated - prophylaxis now       Opacity of lung on imaging study  CXR findings noted        Pneumonia

## 2019-01-19 NOTE — PROGRESS NOTES
[facility]    Progress Note    Liz Arredondo Patient Status:  Inpatient    1990 MRN [id number]   Location [facility] 4W/SW/SE Attending Sheryle Moccasin, MD   Hosp Day # 6 PCP No primary care provider on file. outpatient care with PCP, hematologist and Gi at [facility]  Total time spent- 40 min               Results:     Lab Results   Component Value Date    WBC 15.1 (H) 01/19/2019    HGB 6.6 (LL) 01/19/2019    HCT 18.7 (L) 01/19/2019     01/19/2019    IGGY

## 2019-01-19 NOTE — PROGRESS NOTES
[facility]    Progress Note    Ignacia Arredondo Patient Status:  Inpatient    1990 MRN [id number]   Location [facility] 4W/SW/SE Attending Max Severino MD   Hosp Day # 6 PCP No primary care provider on file[facility] [address] [facility]  [phone]      01/19/19    This note was created using a voice-recognition transcribing system. Incorrect words or phrases may have been missed during proofreading. Please interpret accordingly.             Results:     Lab Results

## 2019-01-20 LAB
BASOPHILS # BLD: 0.1 K/UL (ref 0–0.2)
BASOPHILS NFR BLD: 1 %
EOSINOPHIL # BLD: 0.3 K/UL (ref 0–0.7)
EOSINOPHIL NFR BLD: 2 %
ERYTHROCYTE [DISTWIDTH] IN BLOOD BY AUTOMATED COUNT: 23 % (ref 11–15)
HCT VFR BLD AUTO: 17.8 % (ref 35–48)
HGB BLD-MCNC: 6.3 G/DL (ref 12–16)
LYMPHOCYTES # BLD: 3.3 K/UL (ref 1–4)
LYMPHOCYTES NFR BLD: 25 %
MCH RBC QN AUTO: 35 PG (ref 27–32)
MCHC RBC AUTO-ENTMCNC: 35.2 G/DL (ref 32–37)
MCV RBC AUTO: 99.3 FL (ref 80–100)
MONOCYTES # BLD: 2.8 K/UL (ref 0–1)
MONOCYTES NFR BLD: 21 %
NEUTROPHILS # BLD AUTO: 6.6 K/UL (ref 1.8–7.7)
NEUTROPHILS NFR BLD: 50 %
PLATELET # BLD AUTO: 228 K/UL (ref 140–400)
PMV BLD AUTO: 8.4 FL (ref 7.4–10.3)
RBC # BLD AUTO: 1.79 M/UL (ref 3.7–5.4)
WBC # BLD AUTO: 13.1 K/UL (ref 4–11)

## 2019-01-20 NOTE — PROGRESS NOTES
[facility]    Progress Note    Tammy Arredondo Patient Status:  Inpatient    1990 MRN [id number]   Location [facility] 4W/SW/SE Attending Neel Ramirez MD   Hosp Day # 7 PCP No primary care provider on file. to establish outpatient care with PCP, hematologist and Gi at [facility]  Total time spent- 40 min               Results:     Lab Results   Component Value Date    WBC 13.1 (H) 01/20/2019    HGB 6.3 (LL) 01/20/2019    HCT 17.8 (L) 01/20/2019     01/20/2019

## 2019-01-20 NOTE — PLAN OF CARE
DISCHARGE PLANNING    • Discharge to home or other facility with appropriate resources Progressing    Planning discharge home when medically stable-possibly tomorrow       PAIN - ADULT    • Verbalizes/displays adequate comfort level or patient's stated simeon

## 2019-01-21 LAB
BASOPHILS # BLD: 0.1 K/UL (ref 0–0.2)
BASOPHILS NFR BLD: 1 %
EOSINOPHIL # BLD: 0.3 K/UL (ref 0–0.7)
EOSINOPHIL NFR BLD: 2 %
ERYTHROCYTE [DISTWIDTH] IN BLOOD BY AUTOMATED COUNT: 22.1 % (ref 11–15)
HCT VFR BLD AUTO: 17.1 % (ref 35–48)
HGB BLD-MCNC: 6.1 G/DL (ref 12–16)
LYMPHOCYTES # BLD: 5.3 K/UL (ref 1–4)
LYMPHOCYTES NFR BLD: 35 %
MCH RBC QN AUTO: 35.5 PG (ref 27–32)
MCHC RBC AUTO-ENTMCNC: 35.6 G/DL (ref 32–37)
MCV RBC AUTO: 99.5 FL (ref 80–100)
MONOCYTES # BLD: 2.8 K/UL (ref 0–1)
MONOCYTES NFR BLD: 18 %
NEUTROPHILS # BLD AUTO: 6.7 K/UL (ref 1.8–7.7)
NEUTROPHILS NFR BLD: 45 %
PLATELET # BLD AUTO: 206 K/UL (ref 140–400)
PMV BLD AUTO: 8.6 FL (ref 7.4–10.3)
RBC # BLD AUTO: 1.72 M/UL (ref 3.7–5.4)
WBC # BLD AUTO: 15.2 K/UL (ref 4–11)

## 2019-01-21 RX ORDER — 0.9 % SODIUM CHLORIDE 0.9 %
VIAL (ML) INJECTION
Status: COMPLETED
Start: 2019-01-21 | End: 2019-01-21

## 2019-01-21 RX ORDER — 0.9 % SODIUM CHLORIDE 0.9 %
VIAL (ML) INJECTION
Status: DISPENSED
Start: 2019-01-21 | End: 2019-01-22

## 2019-01-21 NOTE — PROGRESS NOTES
[facility] - [facility]    Hematology/Oncology   Progress Note    Aneita Ready Arnulfo Patient Status:  Inpatient    1990 MRN [id number]   Location [facility] 4W/SW/SE Attending Jaime Arechiga MD   Hosp Day # 8 PCP No primary care MD

## 2019-01-21 NOTE — PROGRESS NOTES
[facility]    Progress Note    Angela Arredondo Patient Status:  Inpatient    1990 MRN [id number]   Location [facility] 4W/SW/SE Attending Suhail Payne MD   Hosp Day # 8 PCP No primary care provider on file. spent- 40 min               Results:     Lab Results   Component Value Date    WBC 15.2 (H) 01/21/2019    HGB 6.1 (LL) 01/21/2019    HCT 17.1 (L) 01/21/2019     01/21/2019    CREATSERUM 0.70 01/19/2019    BUN 9 01/19/2019     01/19/2019    K 3

## 2019-01-21 NOTE — PLAN OF CARE
Problem: Patient/Family Goals  Goal: Patient/Family Long Term Goal  Patient's Long Term Goal: Go home    Interventions:  - ambulate  - advance diet as tolerated  - monitor VS    - See additional Care Plan goals for specific interventions   Outcome: Progres reduce risk of injury  - Provide assistive devices as appropriate  - Consider OT/PT consult to assist with strengthening/mobility  - Encourage toileting schedule  Outcome: Progressing      Problem: DISCHARGE PLANNING  Goal: Discharge to home or other facil values, beliefs, and cultural backgrounds into the planning and delivery of care  - Encourage patient/family to participate in care and decision-making at the level they choose  - Honor patient and family perspectives and choices   Outcome: Progressing

## 2019-01-22 ENCOUNTER — APPOINTMENT (OUTPATIENT)
Dept: GENERAL RADIOLOGY | Facility: HOSPITAL | Age: 29
DRG: 811 | End: 2019-01-22
Attending: INTERNAL MEDICINE
Payer: COMMERCIAL

## 2019-01-22 LAB
BACTERIA UR QL AUTO: NEGATIVE /HPF
BASOPHILS # BLD: 0 K/UL (ref 0–0.2)
BASOPHILS NFR BLD: 0 %
BILIRUB UR QL: NEGATIVE
CLARITY UR: CLEAR
COLOR UR: YELLOW
EOSINOPHIL # BLD: 0.2 K/UL (ref 0–0.7)
EOSINOPHIL NFR BLD: 1 %
ERYTHROCYTE [DISTWIDTH] IN BLOOD BY AUTOMATED COUNT: 22.7 % (ref 11–15)
GLUCOSE UR-MCNC: NEGATIVE MG/DL
HCT VFR BLD AUTO: 17.2 % (ref 35–48)
HGB BLD-MCNC: 6.1 G/DL (ref 12–16)
KETONES UR-MCNC: NEGATIVE MG/DL
LEUKOCYTE ESTERASE UR QL STRIP.AUTO: NEGATIVE
LYMPHOCYTES # BLD: 4.7 K/UL (ref 1–4)
LYMPHOCYTES NFR BLD: 26 %
MCH RBC QN AUTO: 35.2 PG (ref 27–32)
MCHC RBC AUTO-ENTMCNC: 35.4 G/DL (ref 32–37)
MCV RBC AUTO: 99.4 FL (ref 80–100)
MONOCYTES # BLD: 2 K/UL (ref 0–1)
MONOCYTES NFR BLD: 11 %
NEUTROPHILS # BLD AUTO: 11.3 K/UL (ref 1.8–7.7)
NEUTROPHILS NFR BLD: 57 %
NEUTS BAND NFR BLD: 5 %
NITRITE UR QL STRIP.AUTO: NEGATIVE
NRBC BLD-RTO: 12 % (ref ?–1)
PH UR: 7 [PH] (ref 5–8)
PLATELET # BLD AUTO: 207 K/UL (ref 140–400)
PMV BLD AUTO: 8.6 FL (ref 7.4–10.3)
PROT UR-MCNC: NEGATIVE MG/DL
RBC # BLD AUTO: 1.73 M/UL (ref 3.7–5.4)
RBC #/AREA URNS AUTO: 2 /HPF
SP GR UR STRIP: 1.01 (ref 1–1.03)
UROBILINOGEN UR STRIP-ACNC: <2
VIT C UR-MCNC: NEGATIVE MG/DL
WBC # BLD AUTO: 18.2 K/UL (ref 4–11)
WBC #/AREA URNS AUTO: 1 /HPF

## 2019-01-22 PROCEDURE — 71045 X-RAY EXAM CHEST 1 VIEW: CPT | Performed by: INTERNAL MEDICINE

## 2019-01-22 PROCEDURE — 99232 SBSQ HOSP IP/OBS MODERATE 35: CPT | Performed by: INTERNAL MEDICINE

## 2019-01-22 RX ORDER — 0.9 % SODIUM CHLORIDE 0.9 %
VIAL (ML) INJECTION
Status: COMPLETED
Start: 2019-01-22 | End: 2019-01-22

## 2019-01-22 RX ORDER — 0.9 % SODIUM CHLORIDE 0.9 %
VIAL (ML) INJECTION
Status: DISPENSED
Start: 2019-01-22 | End: 2019-01-23

## 2019-01-22 NOTE — PROGRESS NOTES
[place] [facility]    Progress Note    Sanjana Smaller Arnulfo Patient Status:  Inpatient    1990 MRN [id number]   Location [facility] 4W/SW/SE Attending Dee Palmer MD   Hosp Day # 9 PCP No primary care provider on file. ALEA        D/w patient and RN   Patient needs to establish outpatient care with PCP, hematologist and Gi at [facility]  Total time spent- 40 min               Results:     Lab Results   Component Value Date    WBC 18.2 (H) 01/22/2019    HGB 6.1 (LL) 01/22/2019

## 2019-01-22 NOTE — PLAN OF CARE
Problem: PAIN - ADULT  Goal: Verbalizes/displays adequate comfort level or patient's stated pain goal  INTERVENTIONS:  - Encourage pt to monitor pain and request assistance  - Assess pain using appropriate pain scale  - Administer analgesics based on type infusing. Heating pads provided for comfort. Patient voiding WNL. Calling appropriately for assistance.

## 2019-01-22 NOTE — PAYOR COMM NOTE
--------------  CONTINUED STAY REVIEW    Payor: Juliette Space #:  [id number]  Authorization Number: [id number]    Admit date: 1/13/19  Admit time: 0016 1/21/19  Blood pressure 131/76, pulse 84, temperature 97.2 °F (36.2 °C), temperature source Or 1/21/2019 2306 Given 2 mg Intravenous Thompson Lovell RN    1/21/2019 1916 Given 2 mg Intravenous Katharine Gage RN    1/21/2019 1520 Given 2 mg Intravenous Apolonia Zaragoza RN    1/21/2019 1133 Given 2 mg Intravenous CRISTINA José

## 2019-01-23 LAB
ALBUMIN SERPL BCP-MCNC: 3 G/DL (ref 3.5–4.8)
ALBUMIN/GLOB SERPL: 0.7 {RATIO} (ref 1–2)
ALP SERPL-CCNC: 220 U/L (ref 32–100)
ALT SERPL-CCNC: 48 U/L (ref 14–54)
ANION GAP SERPL CALC-SCNC: 10 MMOL/L (ref 0–18)
AST SERPL-CCNC: 97 U/L (ref 15–41)
BASOPHILS # BLD: 0.2 K/UL (ref 0–0.2)
BASOPHILS NFR BLD: 1 %
BILIRUB SERPL-MCNC: 5.3 MG/DL (ref 0.3–1.2)
BUN SERPL-MCNC: 6 MG/DL (ref 8–20)
BUN/CREAT SERPL: 9 (ref 10–20)
CALCIUM SERPL-MCNC: 8.6 MG/DL (ref 8.5–10.5)
CHLORIDE SERPL-SCNC: 106 MMOL/L (ref 95–110)
CO2 SERPL-SCNC: 22 MMOL/L (ref 22–32)
CREAT SERPL-MCNC: 0.67 MG/DL (ref 0.5–1.5)
EOSINOPHIL # BLD: 0.3 K/UL (ref 0–0.7)
EOSINOPHIL NFR BLD: 2 %
ERYTHROCYTE [DISTWIDTH] IN BLOOD BY AUTOMATED COUNT: 22.5 % (ref 11–15)
GLOBULIN PLAS-MCNC: 4.3 G/DL (ref 2.5–3.7)
GLUCOSE SERPL-MCNC: 105 MG/DL (ref 70–99)
HCT VFR BLD AUTO: 18.3 % (ref 35–48)
HCV RNA SERPL NAA+PROBE-ACNC: ABNORMAL IU/ML
HCV RNA SERPL NAA+PROBE-LOG IU: 4.95 LOG (IU/ML)
HCV RNA SERPL QL NAA+PROBE: DETECTED
HGB BLD-MCNC: 6.5 G/DL (ref 12–16)
LYMPHOCYTES # BLD: 2.9 K/UL (ref 1–4)
LYMPHOCYTES NFR BLD: 19 %
MCH RBC QN AUTO: 36 PG (ref 27–32)
MCHC RBC AUTO-ENTMCNC: 35.6 G/DL (ref 32–37)
MCV RBC AUTO: 101.2 FL (ref 80–100)
METAMYELOCYTES # BLD MANUAL: 0.15 K/UL
MONOCYTES # BLD: 1.1 K/UL (ref 0–1)
MONOCYTES NFR BLD: 7 %
MYELOCYTES NFR BLD: 1 %
NEUTROPHILS # BLD AUTO: 10.6 K/UL (ref 1.8–7.7)
NEUTROPHILS NFR BLD: 66 %
NEUTS BAND NFR BLD: 4 %
NRBC BLD-RTO: 5 % (ref ?–1)
OSMOLALITY UR CALC.SUM OF ELEC: 284 MOSM/KG (ref 275–295)
PLATELET # BLD AUTO: 222 K/UL (ref 140–400)
PMV BLD AUTO: 8.3 FL (ref 7.4–10.3)
POTASSIUM SERPL-SCNC: 4.3 MMOL/L (ref 3.3–5.1)
PROT SERPL-MCNC: 7.3 G/DL (ref 5.9–8.4)
RBC # BLD AUTO: 1.81 M/UL (ref 3.7–5.4)
SODIUM SERPL-SCNC: 138 MMOL/L (ref 136–144)
WBC # BLD AUTO: 15.2 K/UL (ref 4–11)

## 2019-01-23 PROCEDURE — 99232 SBSQ HOSP IP/OBS MODERATE 35: CPT | Performed by: INTERNAL MEDICINE

## 2019-01-23 NOTE — PROGRESS NOTES
[place] [facility] - [facility]    Progress Note    Libra Arredondo Patient Status:  Inpatient    1990 MRN [id number]   Location [facility] 4W/SW/SE Attending Evon Moody MD   Hosp Day # 10 PCP No primary care provider on file. Leucocytosis  - likely reactive  - no fevers  - UA and repeat CXR normal  - blood cultures pending    6- Nausea/vomiting  - reglan and compazine PRN  - likely gastroenteritis  - full liquid diet as tolerated  - IVF      7- Disposition  - hopefully soon onc

## 2019-01-23 NOTE — PROGRESS NOTES
[facility]    Hematology/Oncology   Progress Note    July Arredondo Patient Status:  Inpatient    1990 MRN [id number]   Location [facility] 4W/SW/SE Attending Luigi Torres MD   Hosp Day # 9 PCP No primary care Leukocytosis is reactive, possibly also due to infectious component/pneumonia.     --With regard to her anemia, we recommend monitoring, given she has iron overload with markedly elevated ferritin, and limiting transfusion. Give prbcs if hgb <6.      For f

## 2019-01-23 NOTE — PAYOR COMM NOTE
--------------  CONTINUED STAY REVIEW    Payor: Fallon Delcid #:  [id number]  Authorization Number: [id number]    Admit date: 1/13/19  Admit time: 4458    Admitting Physician: Renita Burton MD  Attending Physician:  Renita Burton MD  P ALB 3.0 (L) 01/23/2019     ALKPHO 220 (H) 01/23/2019     BILT 5.3 (H) 01/23/2019     TP 7.3 01/23/2019     AST 97 (H) 01/23/2019     ALT 48 01/23/2019     MEDICATIONS ADMINISTERED IN LAST 1 DAY:  diphenhydrAMINE (BENADRYL) cap/tab 25 mg     Date Action Cruz Raya User    1/23/2019 0855 Given 10 mg Intravenous Gita CRISTINA Baugh      0.9%  NaCl infusion     Date Action Dose Route User    1/23/2019 0813 New Bag (none) Intravenous Gita Baugh RN    1/22/2019 2349 New Bag (none) Intravenous Ila Shearer RN    1/

## 2019-01-24 PROBLEM — R91.8 OPACITY OF LUNG ON IMAGING STUDY: Status: RESOLVED | Noted: 2019-01-13 | Resolved: 2019-01-24

## 2019-01-24 PROBLEM — R09.02 HYPOXIA: Status: RESOLVED | Noted: 2018-07-21 | Resolved: 2019-01-24

## 2019-01-24 LAB
ALBUMIN SERPL BCP-MCNC: 2.6 G/DL (ref 3.5–4.8)
ALBUMIN/GLOB SERPL: 0.6 {RATIO} (ref 1–2)
ALP SERPL-CCNC: 203 U/L (ref 32–100)
ALT SERPL-CCNC: 45 U/L (ref 14–54)
ANION GAP SERPL CALC-SCNC: 8 MMOL/L (ref 0–18)
AST SERPL-CCNC: 89 U/L (ref 15–41)
BASOPHILS # BLD: 0 K/UL (ref 0–0.2)
BASOPHILS NFR BLD: 0 %
BILIRUB SERPL-MCNC: 4.1 MG/DL (ref 0.3–1.2)
BUN SERPL-MCNC: 6 MG/DL (ref 8–20)
BUN/CREAT SERPL: 8.2 (ref 10–20)
CALCIUM SERPL-MCNC: 8.3 MG/DL (ref 8.5–10.5)
CHLORIDE SERPL-SCNC: 105 MMOL/L (ref 95–110)
CO2 SERPL-SCNC: 22 MMOL/L (ref 22–32)
CREAT SERPL-MCNC: 0.73 MG/DL (ref 0.5–1.5)
EOSINOPHIL # BLD: 0.2 K/UL (ref 0–0.7)
EOSINOPHIL NFR BLD: 1 %
ERYTHROCYTE [DISTWIDTH] IN BLOOD BY AUTOMATED COUNT: 25.4 % (ref 11–15)
GLOBULIN PLAS-MCNC: 4.1 G/DL (ref 2.5–3.7)
GLUCOSE SERPL-MCNC: 114 MG/DL (ref 70–99)
HCT VFR BLD AUTO: 18.5 % (ref 35–48)
HCV GENTYP SERPL NAA+PROBE: 1
HGB BLD-MCNC: 6.7 G/DL (ref 12–16)
LYMPHOCYTES # BLD: 3.7 K/UL (ref 1–4)
LYMPHOCYTES NFR BLD: 23 %
MCH RBC QN AUTO: 36.8 PG (ref 27–32)
MCHC RBC AUTO-ENTMCNC: 36 G/DL (ref 32–37)
MCV RBC AUTO: 102.2 FL (ref 80–100)
MONOCYTES # BLD: 1.8 K/UL (ref 0–1)
MONOCYTES NFR BLD: 11 %
NEUTROPHILS # BLD AUTO: 10.3 K/UL (ref 1.8–7.7)
NEUTROPHILS NFR BLD: 63 %
NEUTS BAND NFR BLD: 2 %
NRBC BLD-RTO: 11 % (ref ?–1)
OSMOLALITY UR CALC.SUM OF ELEC: 278 MOSM/KG (ref 275–295)
PLATELET # BLD AUTO: 231 K/UL (ref 140–400)
PMV BLD AUTO: 8.7 FL (ref 7.4–10.3)
POTASSIUM SERPL-SCNC: 4 MMOL/L (ref 3.3–5.1)
PROT SERPL-MCNC: 6.7 G/DL (ref 5.9–8.4)
RBC # BLD AUTO: 1.81 M/UL (ref 3.7–5.4)
SODIUM SERPL-SCNC: 135 MMOL/L (ref 136–144)
WBC # BLD AUTO: 15.9 K/UL (ref 4–11)

## 2019-01-24 PROCEDURE — 99232 SBSQ HOSP IP/OBS MODERATE 35: CPT | Performed by: INTERNAL MEDICINE

## 2019-01-24 RX ORDER — HYDROMORPHONE HYDROCHLORIDE 2 MG/1
TABLET ORAL
Qty: 30 TABLET | Refills: 0 | Status: SHIPPED | OUTPATIENT
Start: 2019-01-24

## 2019-01-24 RX ORDER — FOLIC ACID 1 MG/1
5 TABLET ORAL DAILY
Qty: 150 TABLET | Refills: 0 | Status: SHIPPED | OUTPATIENT
Start: 2019-01-24

## 2019-01-24 RX ORDER — OXYCODONE AND ACETAMINOPHEN 10; 325 MG/1; MG/1
1 TABLET ORAL EVERY 4 HOURS PRN
Qty: 30 TABLET | Refills: 0 | Status: SHIPPED | OUTPATIENT
Start: 2019-01-24

## 2019-01-24 RX ORDER — HYDROMORPHONE HYDROCHLORIDE 2 MG/1
2 TABLET ORAL
Status: DISCONTINUED | OUTPATIENT
Start: 2019-01-24 | End: 2019-01-25

## 2019-01-24 RX ORDER — HYDROMORPHONE HYDROCHLORIDE 1 MG/ML
2 INJECTION, SOLUTION INTRAMUSCULAR; INTRAVENOUS; SUBCUTANEOUS EVERY 6 HOURS PRN
Status: DISCONTINUED | OUTPATIENT
Start: 2019-01-24 | End: 2019-01-25

## 2019-01-24 NOTE — PROGRESS NOTES
[place] [facility] - [facility]    Progress Note    Libra Arredondo Patient Status:  Inpatient    1990 MRN [id number]   Location [facility] 4W/SW/SE Attending Evon Moody MD   Hosp Day # 11 PCP No primary care provider on file. resolved  - repeat CXR normal    5- Leucocytosis  - likely reactive  - no fevers  - UA and repeat CXR normal  - blood cultures NGTD    6- Nausea/vomiting- resolved  - reglan and compazine PRN  - likely gastroenteritis      7- Disposition  - PO home pain me

## 2019-01-24 NOTE — PROGRESS NOTES
[facility]    Hematology/Oncology   Progress Note    Annette Arredondo Patient Status:  Inpatient    1990 MRN [id number]   Location [facility] 4W/SW/SE Attending Niki Hilario MD   Hosp Day # 10 PCP No primary care crisis.     --continues to have pain. ivf and continue/titrate IV pain medications. Monitor for improvement. Slow progress     --She has hyperbilirubinemia. She has trans-laminitis which is improving.   Part of her hyperbilirubinemia is likely due to h

## 2019-01-25 VITALS
HEART RATE: 79 BPM | DIASTOLIC BLOOD PRESSURE: 65 MMHG | BODY MASS INDEX: 19.26 KG/M2 | SYSTOLIC BLOOD PRESSURE: 112 MMHG | TEMPERATURE: 98 F | HEIGHT: 69 IN | WEIGHT: 130 LBS | RESPIRATION RATE: 16 BRPM | OXYGEN SATURATION: 88 %

## 2019-01-25 PROCEDURE — 99232 SBSQ HOSP IP/OBS MODERATE 35: CPT | Performed by: INTERNAL MEDICINE

## 2019-01-25 RX ORDER — METOCLOPRAMIDE 5 MG/1
10 TABLET ORAL EVERY 6 HOURS PRN
Qty: 20 TABLET | Refills: 0 | Status: SHIPPED | OUTPATIENT
Start: 2019-01-25

## 2019-01-25 NOTE — PLAN OF CARE
Problem: Patient/Family Goals  Goal: Patient/Family Short Term Goal  Patient's Short Term Goal: Be pain free    Interventions:   - Administer pain medications  - Ambulate  - Provide non-pharmacological interventions    - See additional Care Plan goals for backgrounds into the planning and delivery of care  - Encourage patient/family to participate in care and decision-making at the level they choose  - Honor patient and family perspectives and choices   Outcome: Progressing      Comments: COURTNEY Montes.  IVF

## 2019-01-25 NOTE — PROGRESS NOTES
[facility]    Hematology/Oncology   Progress Note    Jono Room Arnulfo Patient Status:  Inpatient    1990 MRN [id number]   Location [facility] 4W/SW/SE Attending Kat Phelps MD   Hosp Day # 11 PCP No primary care crisis.     --clinically improved. Okay with discharge tomorrow if pain controlled.      --She has hyperbilirubinemia. She has trans-laminitis which is improving.   Part of her hyperbilirubinemia is likely due to hemolysis also history of hepatitis C and

## 2019-01-25 NOTE — PROGRESS NOTES
[facility]    Hematology/Oncology   Progress Note    Ramin Speaker Arnulfo Patient Status:  Inpatient    1990 MRN [id number]   Location [facility] 4W/SW/SE Attending Lynne Austin MD   Hosp Day # 12 PCP No primary care markedly elevated ferritin, and limiting transfusion. Give prbcs if hgb <6. For follow up we recommend [facility].   Unfortunately here insurance is not accepted for outpatient care her at [facility] for Eugenio Lowe MD

## 2019-01-25 NOTE — CM/SW NOTE
CTL rounds:  Met with Latia at bedside to discuss discharge planning. Patient lives with her fiance and two children ages 3and 1years old. States that her fiance is very supportive.     Pt informed that she needs to follow up with her MD regarding post dis

## 2019-01-25 NOTE — DISCHARGE SUMMARY
[place] [facility]    Discharge Summary    [facility] Patient Status:  Inpatient    1990 MRN [id number]   Location [facility] 4W/SW/SE Attending Jazmyne Stone MD   Hosp Day # 12 PCP No primary care provider on file. iron overload limiting tranfusions     3- Elevated LFT- improved  - secondary to above vs hep C vs iron overload  - continue to monitor- hydration  - Gi input appreciated  - patient to f/u at [facility] after discharge.  Phone number to call and make appointment p

## 2019-01-25 NOTE — PAYOR COMM NOTE
--------------  ADMISSION REVIEW     Payor: Margarita Moreno #:  [id number]  Authorization Number: [id number]    Admit date: 1/13/19  Admit time: 7403     Admitting Physician: Silverio Vargas MD  Attending Physician:  Silverio Vargas MD  Prim Mother      Review of Systems :  Constitutional: As per HPI  Respiratory: (+) cough. Gastrointestinal: Negative for vomiting and abdominal pain. Genitourinary: Negative for dysuria and hematuria.    Positive for stated complaint: sickle cell/ pain all ov normal limits   PROCALCITONIN - Abnormal; Notable for the following components:    Procalcitonin 0.31 (*)     All other components within normal limits   CBC W/ DIFFERENTIAL - Abnormal; Notable for the following components:    WBC 15.3 (*)     RBC 2.12 (*) Impression:  Sickle cell crisis ([facility] [address]  (primary encounter diagnosis)  Hypoxia  Opacity of lung on imaging study    Disposition:  Rufina Neumann MD on 1/12/2019 11:55 PM    H&P signed by Sunny Adames MD at 1/13/2019  6:23 PM     Author:  Tomas Melendez (1.753 m), weight 130 lb (59 kg), last menstrual period 12/08/2018, SpO2 94 %. General: No acute distress. Alert and oriented x 3. HEENT: Moist mucous membranes. EOM-I. PERRL  Neck: No lymphadenopathy. No JVD. No carotid bruits.   Respiratory: Clear t acquired pneumonia  - complete 7 days of levaquin  - respiratory symptoms improving.      D/w patient and RN   Patient needs to establish outpatient care with PCP and hematologist  Manolo Victor MD  1/13/2019    MEDICATIONS ADMINISTERED IN

## 2019-01-28 ENCOUNTER — TELEPHONE (OUTPATIENT)
Dept: MEDSURG UNIT | Facility: HOSPITAL | Age: 29
End: 2019-01-28

## 2019-01-28 NOTE — PAYOR COMM NOTE
--------------  DISCHARGE REVIEW    Payor: Yelitza Duran #:  [id number]  Authorization Number: [id number]    Admit date: 1/13/19  Admit time:  0016  Discharge Date: 1/25/2019  3:29 PM     Admitting Physician: Gordo Lyons MD  Attending Physi tenderness  Neurologic: No focal neurological deficits. Musculoskeletal: Full range of motion of all extremities. No swelling noted. Integument: No lesions. No erythema. Psychiatric: Appropriate mood and affect.     Hospital Course:   1- Sickle cell cri Meds - Modified    folic acid 1 MG Oral Tab  Take 5 tablets (5 mg total) by mouth daily. HYDROmorphone HCl 2 MG Oral Tab  Take one tablet by mouth every four hours ONLY for severe pain.     oxyCODONE-acetaminophen (PERCOCET)  MG Oral Tab  Take 1 ta

## 2022-01-01 NOTE — PROGRESS NOTES
[facility]     Gastroenterology Progress Note    Ramin Speaker Arnulfo Patient Status:  Inpatient    1990 MRN [id number]   Location [facility] 4W/SW/SE Attending Kishore Liriano MD   Hosp Day # 3 PCP None Pcp       Subj for a repeat sonogram in 6 months which was not completed. Patient denies known family history of liver disease. Will discuss with Dr. Nanette Marsh re: next steps - likely initiation of hep C treatment outpatient with our group.     2. Elevated LFTs/Jaundice: hyperb 07/23/2018   BILT 4.9 (H) 07/23/2018   TP 6.9 07/23/2018    (H) 07/23/2018   ALT 69 (H) 07/23/2018   LIP 46 07/21/2018       Us Liver (cpt=76705)    Result Date: 7/23/2018  CONCLUSION:  1.  Sonographic features which can be seen in the setting of chr None